# Patient Record
Sex: MALE | Race: WHITE | NOT HISPANIC OR LATINO | Employment: FULL TIME | RURAL
[De-identification: names, ages, dates, MRNs, and addresses within clinical notes are randomized per-mention and may not be internally consistent; named-entity substitution may affect disease eponyms.]

---

## 2022-08-18 ENCOUNTER — OFFICE VISIT (OUTPATIENT)
Dept: FAMILY MEDICINE | Facility: CLINIC | Age: 62
End: 2022-08-18
Payer: OTHER GOVERNMENT

## 2022-08-18 VITALS
OXYGEN SATURATION: 96 % | SYSTOLIC BLOOD PRESSURE: 126 MMHG | WEIGHT: 215.88 LBS | DIASTOLIC BLOOD PRESSURE: 84 MMHG | TEMPERATURE: 99 F | RESPIRATION RATE: 16 BRPM | HEART RATE: 72 BPM | BODY MASS INDEX: 28.61 KG/M2 | HEIGHT: 73 IN

## 2022-08-18 DIAGNOSIS — I10 ESSENTIAL HYPERTENSION: Primary | Chronic | ICD-10-CM

## 2022-08-18 DIAGNOSIS — M76.61 ACHILLES TENDINITIS OF RIGHT LOWER EXTREMITY: ICD-10-CM

## 2022-08-18 DIAGNOSIS — Z79.899 ENCOUNTER FOR LONG-TERM (CURRENT) USE OF OTHER MEDICATIONS: ICD-10-CM

## 2022-08-18 DIAGNOSIS — Z12.5 ENCOUNTER FOR SCREENING FOR MALIGNANT NEOPLASM OF PROSTATE: ICD-10-CM

## 2022-08-18 LAB
ALBUMIN SERPL BCP-MCNC: 4.2 G/DL (ref 3.5–5)
ALBUMIN/GLOB SERPL: 1.2 {RATIO}
ALP SERPL-CCNC: 61 U/L (ref 45–115)
ALT SERPL W P-5'-P-CCNC: 24 U/L (ref 16–61)
ANION GAP SERPL CALCULATED.3IONS-SCNC: 12 MMOL/L (ref 7–16)
AST SERPL W P-5'-P-CCNC: 18 U/L (ref 15–37)
BASOPHILS # BLD AUTO: 0.04 K/UL (ref 0–0.2)
BASOPHILS NFR BLD AUTO: 0.6 % (ref 0–1)
BILIRUB SERPL-MCNC: 0.8 MG/DL (ref 0–1.2)
BILIRUB UR QL STRIP: NEGATIVE
BUN SERPL-MCNC: 13 MG/DL (ref 7–18)
BUN/CREAT SERPL: 15 (ref 6–20)
CALCIUM SERPL-MCNC: 8.9 MG/DL (ref 8.5–10.1)
CHLORIDE SERPL-SCNC: 101 MMOL/L (ref 98–107)
CHOLEST SERPL-MCNC: 191 MG/DL (ref 0–200)
CHOLEST/HDLC SERPL: 3.2 {RATIO}
CLARITY UR: CLEAR
CO2 SERPL-SCNC: 27 MMOL/L (ref 21–32)
COLOR UR: NORMAL
CREAT SERPL-MCNC: 0.87 MG/DL (ref 0.7–1.3)
DIFFERENTIAL METHOD BLD: ABNORMAL
EGFR (NO RACE VARIABLE) (RUSH/TITUS): 98 ML/MIN/1.73M²
EOSINOPHIL # BLD AUTO: 0.34 K/UL (ref 0–0.5)
EOSINOPHIL NFR BLD AUTO: 4.9 % (ref 1–4)
ERYTHROCYTE [DISTWIDTH] IN BLOOD BY AUTOMATED COUNT: 12.9 % (ref 11.5–14.5)
GLOBULIN SER-MCNC: 3.6 G/DL (ref 2–4)
GLUCOSE SERPL-MCNC: 90 MG/DL (ref 74–106)
GLUCOSE UR STRIP-MCNC: NORMAL MG/DL
HCT VFR BLD AUTO: 45.6 % (ref 40–54)
HDLC SERPL-MCNC: 60 MG/DL (ref 40–60)
HGB BLD-MCNC: 15.7 G/DL (ref 13.5–18)
IMM GRANULOCYTES # BLD AUTO: 0.01 K/UL (ref 0–0.04)
IMM GRANULOCYTES NFR BLD: 0.1 % (ref 0–0.4)
KETONES UR STRIP-SCNC: NEGATIVE MG/DL
LDLC SERPL CALC-MCNC: 101 MG/DL
LDLC/HDLC SERPL: 1.7 {RATIO}
LEUKOCYTE ESTERASE UR QL STRIP: NEGATIVE
LYMPHOCYTES # BLD AUTO: 2.29 K/UL (ref 1–4.8)
LYMPHOCYTES NFR BLD AUTO: 32.7 % (ref 27–41)
MCH RBC QN AUTO: 30 PG (ref 27–31)
MCHC RBC AUTO-ENTMCNC: 34.4 G/DL (ref 32–36)
MCV RBC AUTO: 87.2 FL (ref 80–96)
MONOCYTES # BLD AUTO: 0.61 K/UL (ref 0–0.8)
MONOCYTES NFR BLD AUTO: 8.7 % (ref 2–6)
MPC BLD CALC-MCNC: 9.9 FL (ref 9.4–12.4)
NEUTROPHILS # BLD AUTO: 3.71 K/UL (ref 1.8–7.7)
NEUTROPHILS NFR BLD AUTO: 53 % (ref 53–65)
NITRITE UR QL STRIP: NEGATIVE
NONHDLC SERPL-MCNC: 131 MG/DL
NRBC # BLD AUTO: 0 X10E3/UL
NRBC, AUTO (.00): 0 %
PH UR STRIP: 6.5 PH UNITS
PLATELET # BLD AUTO: 287 K/UL (ref 150–400)
POTASSIUM SERPL-SCNC: 4 MMOL/L (ref 3.5–5.1)
PROT SERPL-MCNC: 7.8 G/DL (ref 6.4–8.2)
PROT UR QL STRIP: NEGATIVE
PSA SERPL-MCNC: 0.69 NG/ML (ref 0–4.1)
RBC # BLD AUTO: 5.23 M/UL (ref 4.6–6.2)
RBC # UR STRIP: NEGATIVE /UL
SODIUM SERPL-SCNC: 136 MMOL/L (ref 136–145)
SP GR UR STRIP: 1.01
TRIGL SERPL-MCNC: 149 MG/DL (ref 35–150)
TSH SERPL DL<=0.005 MIU/L-ACNC: 2.1 UIU/ML (ref 0.36–3.74)
UROBILINOGEN UR STRIP-ACNC: NORMAL MG/DL
VLDLC SERPL-MCNC: 30 MG/DL
WBC # BLD AUTO: 7 K/UL (ref 4.5–11)

## 2022-08-18 PROCEDURE — 85025 COMPLETE CBC W/AUTO DIFF WBC: CPT | Mod: ,,, | Performed by: CLINICAL MEDICAL LABORATORY

## 2022-08-18 PROCEDURE — 80053 COMPREHENSIVE METABOLIC PANEL: ICD-10-PCS | Mod: ,,, | Performed by: CLINICAL MEDICAL LABORATORY

## 2022-08-18 PROCEDURE — 80061 LIPID PANEL: ICD-10-PCS | Mod: ,,, | Performed by: CLINICAL MEDICAL LABORATORY

## 2022-08-18 PROCEDURE — 81003 URINALYSIS: ICD-10-PCS | Mod: QW,,, | Performed by: CLINICAL MEDICAL LABORATORY

## 2022-08-18 PROCEDURE — 85025 CBC WITH DIFFERENTIAL: ICD-10-PCS | Mod: ,,, | Performed by: CLINICAL MEDICAL LABORATORY

## 2022-08-18 PROCEDURE — G0103 PSA, SCREENING: ICD-10-PCS | Mod: ,,, | Performed by: CLINICAL MEDICAL LABORATORY

## 2022-08-18 PROCEDURE — 99204 OFFICE O/P NEW MOD 45 MIN: CPT | Mod: ,,, | Performed by: NURSE PRACTITIONER

## 2022-08-18 PROCEDURE — 84443 TSH: ICD-10-PCS | Mod: ,,, | Performed by: CLINICAL MEDICAL LABORATORY

## 2022-08-18 PROCEDURE — 99204 PR OFFICE/OUTPT VISIT, NEW, LEVL IV, 45-59 MIN: ICD-10-PCS | Mod: ,,, | Performed by: NURSE PRACTITIONER

## 2022-08-18 PROCEDURE — 80053 COMPREHEN METABOLIC PANEL: CPT | Mod: ,,, | Performed by: CLINICAL MEDICAL LABORATORY

## 2022-08-18 PROCEDURE — 81003 URINALYSIS AUTO W/O SCOPE: CPT | Mod: QW,,, | Performed by: CLINICAL MEDICAL LABORATORY

## 2022-08-18 PROCEDURE — 80061 LIPID PANEL: CPT | Mod: ,,, | Performed by: CLINICAL MEDICAL LABORATORY

## 2022-08-18 PROCEDURE — G0103 PSA SCREENING: HCPCS | Mod: ,,, | Performed by: CLINICAL MEDICAL LABORATORY

## 2022-08-18 PROCEDURE — 84443 ASSAY THYROID STIM HORMONE: CPT | Mod: ,,, | Performed by: CLINICAL MEDICAL LABORATORY

## 2022-08-18 RX ORDER — LISINOPRIL AND HYDROCHLOROTHIAZIDE 10; 12.5 MG/1; MG/1
0.5 TABLET ORAL EVERY OTHER DAY
Qty: 45 TABLET | Refills: 3 | Status: SHIPPED | OUTPATIENT
Start: 2022-08-18 | End: 2023-10-02

## 2022-08-18 RX ORDER — FLUTICASONE PROPIONATE 50 MCG
1 SPRAY, SUSPENSION (ML) NASAL DAILY
COMMUNITY
Start: 2022-04-19

## 2022-08-18 RX ORDER — LISINOPRIL AND HYDROCHLOROTHIAZIDE 10; 12.5 MG/1; MG/1
0.5 TABLET ORAL EVERY OTHER DAY
COMMUNITY
Start: 2022-06-01 | End: 2022-08-18 | Stop reason: SDUPTHER

## 2022-08-18 NOTE — PROGRESS NOTES
Subjective:       Patient ID: Julio Hummel is a 62 y.o. male.    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 Hx of multiple TIAs (last was in 2039-0045); controlling diet and BP now and has done well. Also, sprain to right achilles tendon several yrs ago and has flare ups occasionally. Flares mostly occur with working; has to wear steel toed boots.     Ankle Pain   Incident location: while running. The injury mechanism was a fall. The pain is present in the right ankle. The quality of the pain is described as aching, burning and shooting. The pain is moderate. The pain has been intermittent since onset. Associated symptoms include an inability to bear weight and a loss of motion. Pertinent negatives include no loss of sensation, muscle weakness, numbness or tingling. He reports no foreign bodies present. The symptoms are aggravated by weight bearing. He has tried rest, non-weight bearing, heat and ice for the symptoms. The treatment provided significant relief.   Hypertension  This is a chronic problem. The current episode started more than 1 year ago. The problem is controlled. Pertinent negatives include no anxiety, blurred vision, chest pain, headaches, malaise/fatigue, neck pain, orthopnea, palpitations, peripheral edema, PND, shortness of breath or sweats. There are no associated agents to hypertension. Risk factors for coronary artery disease include male gender and stress. Past treatments include ACE inhibitors and diuretics. The current treatment provides significant improvement. There are no compliance problems.  There is no history of angina, kidney disease, CAD/MI, CVA, heart failure, left ventricular hypertrophy, PVD or retinopathy. There is no history of chronic renal disease, coarctation of the aorta, hyperaldosteronism, hypercortisolism, hyperparathyroidism, a hypertension causing med, pheochromocytoma, renovascular disease, sleep  apnea or a thyroid problem.     Review of Systems   Constitutional: Negative for activity change, appetite change, chills, fatigue, fever, malaise/fatigue and unexpected weight change.   HENT: Negative for nasal congestion, ear pain, facial swelling, mouth sores, nosebleeds, postnasal drip, rhinorrhea, sinus pressure/congestion and sore throat.    Eyes: Negative for blurred vision, photophobia, pain, discharge and visual disturbance.   Respiratory: Negative for cough, chest tightness and shortness of breath.    Cardiovascular: Negative for chest pain, palpitations, orthopnea, leg swelling and PND.   Gastrointestinal: Negative for abdominal distention and abdominal pain.   Genitourinary: Negative for difficulty urinating, dysuria and testicular pain.   Musculoskeletal: Negative for arthralgias, back pain, gait problem and neck pain.   Integumentary:  Negative for pallor, rash and mole/lesion.   Neurological: Negative for dizziness, tingling, syncope, weakness, light-headedness, numbness and headaches.   Hematological: Negative for adenopathy. Does not bruise/bleed easily.   Psychiatric/Behavioral: Negative for sleep disturbance.         Objective:      Physical Exam  Vitals reviewed.   Constitutional:       Appearance: He is normal weight.   HENT:      Head: Normocephalic.      Right Ear: Tympanic membrane, ear canal and external ear normal.      Left Ear: Tympanic membrane, ear canal and external ear normal.      Nose: Nose normal.      Mouth/Throat:      Mouth: Mucous membranes are moist.   Eyes:      Pupils: Pupils are equal, round, and reactive to light.   Neck:      Vascular: No carotid bruit.   Cardiovascular:      Rate and Rhythm: Regular rhythm.      Pulses: Normal pulses.      Heart sounds: Normal heart sounds.   Pulmonary:      Effort: Pulmonary effort is normal.      Breath sounds: Normal breath sounds.   Abdominal:      General: Bowel sounds are normal.      Palpations: Abdomen is soft.   Musculoskeletal:          General: Normal range of motion.      Cervical back: Normal range of motion and neck supple.   Skin:     General: Skin is warm and dry.      Capillary Refill: Capillary refill takes less than 2 seconds.   Neurological:      Mental Status: He is alert and oriented to person, place, and time.      Gait: Gait normal.   Psychiatric:         Mood and Affect: Mood normal.         Behavior: Behavior normal.         Assessment:       Problem List Items Addressed This Visit        Cardiac/Vascular    Essential hypertension - Primary    Relevant Medications    lisinopriL-hydrochlorothiazide (PRINZIDE,ZESTORETIC) 10-12.5 mg per tablet    Other Relevant Orders    CBC Auto Differential    Comprehensive Metabolic Panel    Lipid Panel    Urinalysis       Renal/    Encounter for screening for malignant neoplasm of prostate    Relevant Orders    PSA, Screening       Orthopedic    Achilles tendinitis of right lower extremity     Needs orthotics in work boots; also recommend 6min ice massage when inflamed, and also recommend daily collagen supplement specifically for joints              Other    Encounter for long-term (current) use of other medications    Relevant Orders    TSH          Plan:       Labs pending. CPT. RTC 6-12mo or prn

## 2022-08-18 NOTE — ASSESSMENT & PLAN NOTE
Needs orthotics in work boots; also recommend 6min ice massage when inflamed, and also recommend daily collagen supplement specifically for joints

## 2023-09-28 ENCOUNTER — HOSPITAL ENCOUNTER (EMERGENCY)
Facility: HOSPITAL | Age: 63
Discharge: HOME | End: 2023-09-28
Payer: OTHER GOVERNMENT

## 2023-09-28 ENCOUNTER — OFFICE VISIT (OUTPATIENT)
Dept: FAMILY MEDICINE | Facility: CLINIC | Age: 63
End: 2023-09-28
Payer: OTHER GOVERNMENT

## 2023-09-28 VITALS
OXYGEN SATURATION: 95 % | WEIGHT: 228.81 LBS | HEIGHT: 73 IN | BODY MASS INDEX: 30.33 KG/M2 | SYSTOLIC BLOOD PRESSURE: 145 MMHG | HEART RATE: 63 BPM | DIASTOLIC BLOOD PRESSURE: 90 MMHG | TEMPERATURE: 98 F | RESPIRATION RATE: 18 BRPM

## 2023-09-28 VITALS
HEIGHT: 73 IN | DIASTOLIC BLOOD PRESSURE: 83 MMHG | WEIGHT: 228.81 LBS | OXYGEN SATURATION: 94 % | SYSTOLIC BLOOD PRESSURE: 159 MMHG | BODY MASS INDEX: 30.33 KG/M2 | RESPIRATION RATE: 18 BRPM | HEART RATE: 76 BPM

## 2023-09-28 DIAGNOSIS — R42 DIZZINESS: Primary | ICD-10-CM

## 2023-09-28 DIAGNOSIS — G45.9 TIA (TRANSIENT ISCHEMIC ATTACK): Primary | ICD-10-CM

## 2023-09-28 DIAGNOSIS — I10 ESSENTIAL HYPERTENSION: ICD-10-CM

## 2023-09-28 DIAGNOSIS — I10 HYPERTENSION: ICD-10-CM

## 2023-09-28 DIAGNOSIS — R73.9 HYPERGLYCEMIA: ICD-10-CM

## 2023-09-28 LAB
ALBUMIN SERPL BCP-MCNC: 4 G/DL (ref 3.5–5)
ALBUMIN/GLOB SERPL: 1.1 {RATIO}
ALP SERPL-CCNC: 79 U/L (ref 45–115)
ALT SERPL W P-5'-P-CCNC: 43 U/L (ref 16–61)
ANION GAP SERPL CALCULATED.3IONS-SCNC: 8 MMOL/L (ref 7–16)
ANION GAP SERPL CALCULATED.3IONS-SCNC: 9 MMOL/L (ref 7–16)
APTT PPP: 25.1 SECONDS (ref 25.2–37.3)
AST SERPL W P-5'-P-CCNC: 27 U/L (ref 15–37)
BASOPHILS # BLD AUTO: 0.05 K/UL (ref 0–0.2)
BASOPHILS # BLD AUTO: 0.06 K/UL (ref 0–0.2)
BASOPHILS NFR BLD AUTO: 0.7 % (ref 0–1)
BASOPHILS NFR BLD AUTO: 0.8 % (ref 0–1)
BILIRUB SERPL-MCNC: 0.4 MG/DL (ref ?–1.2)
BUN SERPL-MCNC: 19 MG/DL (ref 7–18)
BUN SERPL-MCNC: 21 MG/DL (ref 7–18)
BUN/CREAT SERPL: 20 (ref 6–20)
BUN/CREAT SERPL: 24 (ref 6–20)
CALCIUM SERPL-MCNC: 8.8 MG/DL (ref 8.5–10.1)
CALCIUM SERPL-MCNC: 9.1 MG/DL (ref 8.5–10.1)
CHLORIDE SERPL-SCNC: 104 MMOL/L (ref 98–107)
CHLORIDE SERPL-SCNC: 106 MMOL/L (ref 98–107)
CO2 SERPL-SCNC: 27 MMOL/L (ref 21–32)
CO2 SERPL-SCNC: 30 MMOL/L (ref 21–32)
CREAT SERPL-MCNC: 0.89 MG/DL (ref 0.7–1.3)
CREAT SERPL-MCNC: 0.94 MG/DL (ref 0.7–1.3)
DIFFERENTIAL METHOD BLD: ABNORMAL
DIFFERENTIAL METHOD BLD: ABNORMAL
EGFR (NO RACE VARIABLE) (RUSH/TITUS): 92 ML/MIN/1.73M2
EGFR (NO RACE VARIABLE) (RUSH/TITUS): 97 ML/MIN/1.73M2
EOSINOPHIL # BLD AUTO: 0.2 K/UL (ref 0–0.5)
EOSINOPHIL # BLD AUTO: 0.22 K/UL (ref 0–0.5)
EOSINOPHIL NFR BLD AUTO: 2.7 % (ref 1–4)
EOSINOPHIL NFR BLD AUTO: 3 % (ref 1–4)
ERYTHROCYTE [DISTWIDTH] IN BLOOD BY AUTOMATED COUNT: 13.5 % (ref 11.5–14.5)
ERYTHROCYTE [DISTWIDTH] IN BLOOD BY AUTOMATED COUNT: 13.5 % (ref 11.5–14.5)
GLOBULIN SER-MCNC: 3.8 G/DL (ref 2–4)
GLUCOSE SERPL-MCNC: 105 MG/DL (ref 74–106)
GLUCOSE SERPL-MCNC: 110 MG/DL (ref 74–106)
HCT VFR BLD AUTO: 45.6 % (ref 40–54)
HCT VFR BLD AUTO: 45.8 % (ref 40–54)
HGB BLD-MCNC: 15.2 G/DL (ref 13.5–18)
HGB BLD-MCNC: 15.3 G/DL (ref 13.5–18)
IMM GRANULOCYTES # BLD AUTO: 0.02 K/UL (ref 0–0.04)
IMM GRANULOCYTES # BLD AUTO: 0.03 K/UL (ref 0–0.04)
IMM GRANULOCYTES NFR BLD: 0.3 % (ref 0–0.4)
IMM GRANULOCYTES NFR BLD: 0.4 % (ref 0–0.4)
INR BLD: 0.98
LYMPHOCYTES # BLD AUTO: 1.77 K/UL (ref 1–4.8)
LYMPHOCYTES # BLD AUTO: 1.84 K/UL (ref 1–4.8)
LYMPHOCYTES NFR BLD AUTO: 24.2 % (ref 27–41)
LYMPHOCYTES NFR BLD AUTO: 25 % (ref 27–41)
MAGNESIUM SERPL-MCNC: 1.9 MG/DL (ref 1.7–2.3)
MCH RBC QN AUTO: 29.6 PG (ref 27–31)
MCH RBC QN AUTO: 29.7 PG (ref 27–31)
MCHC RBC AUTO-ENTMCNC: 33.3 G/DL (ref 32–36)
MCHC RBC AUTO-ENTMCNC: 33.4 G/DL (ref 32–36)
MCV RBC AUTO: 88.8 FL (ref 80–96)
MCV RBC AUTO: 88.9 FL (ref 80–96)
MONOCYTES # BLD AUTO: 0.45 K/UL (ref 0–0.8)
MONOCYTES # BLD AUTO: 0.47 K/UL (ref 0–0.8)
MONOCYTES NFR BLD AUTO: 6.1 % (ref 2–6)
MONOCYTES NFR BLD AUTO: 6.4 % (ref 2–6)
MPC BLD CALC-MCNC: 10 FL (ref 9.4–12.4)
MPC BLD CALC-MCNC: 9.6 FL (ref 9.4–12.4)
NEUTROPHILS # BLD AUTO: 4.78 K/UL (ref 1.8–7.7)
NEUTROPHILS # BLD AUTO: 4.78 K/UL (ref 1.8–7.7)
NEUTROPHILS NFR BLD AUTO: 64.8 % (ref 53–65)
NEUTROPHILS NFR BLD AUTO: 65.6 % (ref 53–65)
NRBC # BLD AUTO: 0 X10E3/UL
NRBC # BLD AUTO: 0 X10E3/UL
NRBC, AUTO (.00): 0 %
NRBC, AUTO (.00): 0 %
PLATELET # BLD AUTO: 264 K/UL (ref 150–400)
PLATELET # BLD AUTO: 284 K/UL (ref 150–400)
POTASSIUM SERPL-SCNC: 4 MMOL/L (ref 3.5–5.1)
POTASSIUM SERPL-SCNC: 4.3 MMOL/L (ref 3.5–5.1)
PROT SERPL-MCNC: 7.8 G/DL (ref 6.4–8.2)
PROTHROMBIN TIME: 12.9 SECONDS (ref 11.7–14.7)
PSA SERPL-MCNC: 0.72 NG/ML
RBC # BLD AUTO: 5.13 M/UL (ref 4.6–6.2)
RBC # BLD AUTO: 5.16 M/UL (ref 4.6–6.2)
SODIUM SERPL-SCNC: 137 MMOL/L (ref 136–145)
SODIUM SERPL-SCNC: 139 MMOL/L (ref 136–145)
TROPONIN I SERPL DL<=0.01 NG/ML-MCNC: 5.2 PG/ML
WBC # BLD AUTO: 7.3 K/UL (ref 4.5–11)
WBC # BLD AUTO: 7.37 K/UL (ref 4.5–11)

## 2023-09-28 PROCEDURE — G0103 PSA SCREENING: HCPCS | Mod: ,,, | Performed by: CLINICAL MEDICAL LABORATORY

## 2023-09-28 PROCEDURE — G0103 PSA, SCREENING: ICD-10-PCS | Mod: ,,, | Performed by: CLINICAL MEDICAL LABORATORY

## 2023-09-28 PROCEDURE — 85730 THROMBOPLASTIN TIME PARTIAL: CPT | Performed by: NURSE PRACTITIONER

## 2023-09-28 PROCEDURE — 99213 OFFICE O/P EST LOW 20 MIN: CPT | Mod: ,,, | Performed by: NURSE PRACTITIONER

## 2023-09-28 PROCEDURE — 84484 ASSAY OF TROPONIN QUANT: CPT | Performed by: NURSE PRACTITIONER

## 2023-09-28 PROCEDURE — 83735 ASSAY OF MAGNESIUM: CPT | Performed by: NURSE PRACTITIONER

## 2023-09-28 PROCEDURE — 80053 COMPREHENSIVE METABOLIC PANEL: ICD-10-PCS | Mod: ,,, | Performed by: CLINICAL MEDICAL LABORATORY

## 2023-09-28 PROCEDURE — 85025 CBC WITH DIFFERENTIAL: ICD-10-PCS | Mod: ,,, | Performed by: CLINICAL MEDICAL LABORATORY

## 2023-09-28 PROCEDURE — 80053 COMPREHEN METABOLIC PANEL: CPT | Mod: ,,, | Performed by: CLINICAL MEDICAL LABORATORY

## 2023-09-28 PROCEDURE — 83036 HEMOGLOBIN A1C: ICD-10-PCS | Mod: ,,, | Performed by: CLINICAL MEDICAL LABORATORY

## 2023-09-28 PROCEDURE — 83036 HEMOGLOBIN GLYCOSYLATED A1C: CPT | Mod: ,,, | Performed by: CLINICAL MEDICAL LABORATORY

## 2023-09-28 PROCEDURE — 85025 COMPLETE CBC W/AUTO DIFF WBC: CPT | Mod: ,,, | Performed by: CLINICAL MEDICAL LABORATORY

## 2023-09-28 PROCEDURE — 99213 PR OFFICE/OUTPT VISIT, EST, LEVL III, 20-29 MIN: ICD-10-PCS | Mod: ,,, | Performed by: NURSE PRACTITIONER

## 2023-09-28 PROCEDURE — 93010 EKG 12-LEAD: ICD-10-PCS | Mod: ,,, | Performed by: STUDENT IN AN ORGANIZED HEALTH CARE EDUCATION/TRAINING PROGRAM

## 2023-09-28 PROCEDURE — 85025 COMPLETE CBC W/AUTO DIFF WBC: CPT | Performed by: NURSE PRACTITIONER

## 2023-09-28 PROCEDURE — 99285 EMERGENCY DEPT VISIT HI MDM: CPT | Mod: 25

## 2023-09-28 PROCEDURE — 80048 BASIC METABOLIC PNL TOTAL CA: CPT | Mod: XB | Performed by: NURSE PRACTITIONER

## 2023-09-28 PROCEDURE — 85610 PROTHROMBIN TIME: CPT | Performed by: NURSE PRACTITIONER

## 2023-09-28 PROCEDURE — 93010 ELECTROCARDIOGRAM REPORT: CPT | Mod: ,,, | Performed by: STUDENT IN AN ORGANIZED HEALTH CARE EDUCATION/TRAINING PROGRAM

## 2023-09-28 PROCEDURE — 93005 ELECTROCARDIOGRAM TRACING: CPT

## 2023-09-28 PROCEDURE — 25000003 PHARM REV CODE 250: Performed by: NURSE PRACTITIONER

## 2023-09-28 PROCEDURE — 99285 PR EMERGENCY DEPT VISIT,LEVEL V: ICD-10-PCS | Mod: ,,, | Performed by: NURSE PRACTITIONER

## 2023-09-28 PROCEDURE — 99285 EMERGENCY DEPT VISIT HI MDM: CPT | Mod: ,,, | Performed by: NURSE PRACTITIONER

## 2023-09-28 RX ORDER — NAPROXEN SODIUM 220 MG/1
81 TABLET, FILM COATED ORAL DAILY
Status: DISCONTINUED | OUTPATIENT
Start: 2023-09-29 | End: 2023-09-28

## 2023-09-28 RX ORDER — NAPROXEN SODIUM 220 MG/1
81 TABLET, FILM COATED ORAL
Status: COMPLETED | OUTPATIENT
Start: 2023-09-28 | End: 2023-09-28

## 2023-09-28 RX ADMIN — ASPIRIN 81 MG CHEWABLE TABLET 81 MG: 81 TABLET CHEWABLE at 04:09

## 2023-09-28 NOTE — ED PROVIDER NOTES
Encounter Date: 9/28/2023       History     Chief Complaint   Patient presents with    Hypertension     63 year old male presents to the emergency department to be evaluated for feeling lightheaded and having headaches intermittently over the last 3 weeks. He had his blood pressure checked, this morning, and it was 175/94, then it was 152/93, then it was 142/58.  He went to the clinic to be evaluated and began having pressure in his left arm and was sent to the emergency department to have an EKG.  He said he feels a little bit of a tingling sensation to the left side of his face that is improving.  Denies any unilateral weakness, difficulty speaking, swallowing, walking.  He had a TIA in 2012. He does not currently have a neurologist.     The history is provided by the patient.     Review of patient's allergies indicates:   Allergen Reactions    Macrolide antibiotics     Cephalosporins Rash    Doxy Rash    Pcn [penicillins] Rash    Sulfa (sulfonamide antibiotics) Rash     No past medical history on file.  No past surgical history on file.  No family history on file.  Social History     Tobacco Use    Smoking status: Never    Smokeless tobacco: Never     Review of Systems   All other systems reviewed and are negative.      Physical Exam     Initial Vitals [09/28/23 1454]   BP Pulse Resp Temp SpO2   (!) 189/106 72 18 98 °F (36.7 °C) 96 %      MAP       --         Physical Exam    Vitals reviewed.  Constitutional: He appears well-developed and well-nourished.   HENT:   Head: Normocephalic and atraumatic.   Eyes: EOM are normal. Pupils are equal, round, and reactive to light.   Neck: Neck supple.   Cardiovascular:  Normal rate and regular rhythm.           Pulmonary/Chest: Breath sounds normal.   Abdominal: Abdomen is soft. Bowel sounds are normal. He exhibits no distension and no mass. There is no abdominal tenderness. There is no rebound and no guarding.   Musculoskeletal:         General: Normal range of motion.       Cervical back: Neck supple.     Neurological: He is alert and oriented to person, place, and time. He has normal strength. No cranial nerve deficit or sensory deficit. GCS score is 15. GCS eye subscore is 4. GCS verbal subscore is 5. GCS motor subscore is 6.   Skin: Skin is warm and dry. Capillary refill takes less than 2 seconds.   Psychiatric: He has a normal mood and affect.         Medical Screening Exam   See Full Note    ED Course   Procedures  Labs Reviewed   BASIC METABOLIC PANEL - Abnormal; Notable for the following components:       Result Value    Glucose 110 (*)     BUN 21 (*)     BUN/Creatinine Ratio 24 (*)     All other components within normal limits   APTT - Abnormal; Notable for the following components:    PTT 25.1 (*)     All other components within normal limits   CBC WITH DIFFERENTIAL - Abnormal; Notable for the following components:    Neutrophils % 65.6 (*)     Lymphocytes % 24.2 (*)     Monocytes % 6.4 (*)     All other components within normal limits   TROPONIN I - Normal   PROTIME-INR - Normal   MAGNESIUM - Normal   CBC W/ AUTO DIFFERENTIAL    Narrative:     The following orders were created for panel order CBC Auto Differential.  Procedure                               Abnormality         Status                     ---------                               -----------         ------                     CBC with Differential[[id number]]       Abnormal            Final result                 Please view results for these tests on the individual orders.          Imaging Results              CT Head Without Contrast (Final result)  Result time 09/28/23 15:05:22      Final result by Dereje Bautista DO (09/28/23 15:05:22)                   Impression:      No convincing imaging evidence of acute intracranial abnormality.  Probable chronic microvascular ischemic change and volume loss.    The CT exam was performed using one or more of the following dose    reduction techniques- Automated exposure  control, adjustment of the mA    and/or kV according to patient size, and/or use of iterative    reconstructed technique.    Point of Service: [facility]      Electronically signed by: Dereje Bautista  Date:    09/28/2023  Time:    15:05               Narrative:    EXAMINATION:  CT HEAD WITHOUT CONTRAST    CLINICAL HISTORY:  Headache, chronic, new features or increased frequency;    COMPARISON:  None    TECHNIQUE:  Multiple axial tomographic images of the brain were obtained without the use of intravenous contrast.    FINDINGS:  Midline structures are nondisplaced.  No convincing evidence of acute intracranial hemorrhage.  No convincing evidence of hydrocephalus.  Mild global volume loss demonstrated.  Mild periventricular and subcortical hypoattenuation noted which is nonspecific but consistent with chronic microvascular ischemic change. Less likely considerations include demyelinating process and vasculitis. Visualized paranasal sinuses and mastoid air cells are predominantly clear.                                       X-Ray Chest 1 View (Final result)  Result time 09/28/23 15:06:33      Final result by Dereje Bautista DO (09/28/23 15:06:33)                   Impression:      No acute cardiopulmonary process demonstrated.    Point of Service: [facility]      Electronically signed by: Dereje Bautista  Date:    09/28/2023  Time:    15:06               Narrative:    EXAMINATION:  XR CHEST 1 VIEW    CLINICAL HISTORY:  Essential (primary) hypertension    COMPARISON:  None    TECHNIQUE:  Frontal view/views of the chest.    FINDINGS:  Heart size appears within normal limits.  Chronic change of the lungs without focal consolidation, pleural effusion, or pneumothorax.  Visualized osseous and surrounding soft tissue structures demonstrate no acute abnormality.                                       Medications   aspirin chewable tablet 81 mg (81 mg Oral Given 9/28/23 1616)     Medical Decision  Making  63 year old male presents to the emergency department to be evaluated for feeling lightheaded and having headaches intermittently over the last 3 weeks. He had his blood pressure checked, this morning, and it was 175/94, then it was 152/93, then it was 142/58.  He went to the clinic to be evaluated and began having pressure in his left arm and was sent to the emergency department to have an EKG.  He said he feels a little bit of a tingling sensation to the left side of his face that is improving.  Denies any unilateral weakness, difficulty speaking, swallowing, walking.  He had a TIA in 2012. He does not currently have a neurologist.   MDM  I ordered labs and personally reviewed them.    I ordered X-rays and personally reviewed them and reviewed the radiologist interpretation.   I ordered EKG and personally reviewed it.  EKG significant for normal sinus rhythm.    I ordered CT scan and personally reviewed it and reviewed the radiologist interpretation.  CT significant for No convincing imaging evidence of acute intracranial abnormality.  Probable chronic microvascular ischemic change and volume loss..    His blood pressure medication was doubled by his PCP earlier today  Diagnosis:  TIA  Discharge [facility]  I discussed the patient presentation and findings with the consultant for Neurology, Karan Toth NP    Will start on a baby aspirin daily and referred to follow-up outpatient for further workup with Neurology  Patient was managed in the ED with aspirin    The response to treatment was improved.    Patient was discharged in stable condition.  Detailed return precautions discussed.      Amount and/or Complexity of Data Reviewed  Labs: ordered.  Radiology: ordered.    Risk  OTC drugs.                               Clinical Impression:   Final diagnoses:  [I10] Hypertension  [G45.9] TIA (transient ischemic attack) (Primary)        ED Disposition Condition    Discharge Stable          ED Prescriptions    None        Follow-up Information    None          Berenice Samaniego, [facility]  09/29/23 0806

## 2023-09-28 NOTE — DISCHARGE INSTRUCTIONS
Take 81 mg aspirin daily.  Follow-up with Neurology, you should be contacted with an appointment.Follow up with your primary care provider in 2 days. Return to the emergency department for any increase in symptoms or for any other new or worrisome symptoms.

## 2023-09-29 ENCOUNTER — TELEPHONE (OUTPATIENT)
Dept: EMERGENCY MEDICINE | Facility: HOSPITAL | Age: 63
End: 2023-09-29
Payer: OTHER GOVERNMENT

## 2023-09-29 PROBLEM — R42 DIZZINESS: Status: ACTIVE | Noted: 2023-09-29

## 2023-09-29 NOTE — PROGRESS NOTES
"Subjective:       Patient ID: Julio Hummel is a 63 y.o. male.    Chief Complaint: Hypertension (Patient is in due to blood pressure issues )    Patient presetns to clinic with complaints of fluctuating blood pressure levels. States over the last 3-4 weeks he has had "episodes" of feeling a "halo effect" or lightheaded feeling with sweating. States he checks his blood pressure at the time being 150-160s/ 90s. Episodes lasting 15-30 mins. States episodes are now lasting closer to an hour. Denies CP or SOB. NO weakness or numbness.    Reports PMH TIA 2012- does not take ASA.    Active Problem List with Overview Notes    Diagnosis Date Noted    Dizziness 09/29/2023    Essential hypertension 08/18/2022    Encounter for screening for malignant neoplasm of prostate 08/18/2022    Encounter for long-term (current) use of other medications 08/18/2022    Achilles tendinitis of right lower extremity 08/18/2022        Review of Systems   Constitutional:  Positive for diaphoresis. Negative for chills, fatigue and fever.   HENT:  Negative for congestion, hearing loss, postnasal drip, rhinorrhea, sore throat, tinnitus and voice change.    Respiratory:  Negative for apnea, cough, choking, chest tightness and shortness of breath.    Cardiovascular:  Negative for chest pain, palpitations and leg swelling.   Gastrointestinal:  Negative for abdominal pain, constipation, diarrhea and nausea.   Genitourinary:  Negative for difficulty urinating.   Neurological:  Positive for light-headedness and headaches. Negative for dizziness, tremors, seizures, syncope, facial asymmetry, speech difficulty, weakness and numbness.   Psychiatric/Behavioral:  Negative for sleep disturbance.         A1C:      CBC:  Recent Labs   Lab 08/18/22  1053 09/28/23  1351 09/28/23  1510   WBC 7.00 7.37 7.30   RBC 5.23 5.16 5.13   Hemoglobin 15.7 15.3 15.2   Hematocrit 45.6 45.8 45.6   Platelet Count 287 284 264   MCV 87.2 88.8 88.9   MCH 30.0 29.7 29.6   MCHC 34.4 " "33.4 33.3     CMP:  Recent Labs   Lab 08/18/22  1053 09/28/23  1351 09/28/23  1510   Glucose 90 105 110 H   Calcium 8.9 9.1 8.8   Albumin 4.2 4.0  --    Total Protein 7.8 7.8  --    Sodium 136 137 139   Potassium 4.0 4.0 4.3   CO2 27 27 30   Chloride 101 106 104   BUN 13 19 H 21 H   Creatinine 0.87 0.94 0.89   Alk Phos 61 79  --    ALT 24 43  --    AST 18 27  --    Bilirubin, Total 0.8 0.4  --          Objective:      Vitals:    09/28/23 1318   BP: (!) 159/83   Pulse: 76   Resp: 18      Physical Exam  Vitals reviewed.   Constitutional:       Appearance: Normal appearance.   HENT:      Head: Normocephalic.      Right Ear: External ear normal.      Left Ear: External ear normal.      Nose: Nose normal.      Mouth/Throat:      Mouth: Mucous membranes are moist.      Pharynx: Oropharynx is clear.   Eyes:      Pupils: Pupils are equal, round, and reactive to light.   Cardiovascular:      Rate and Rhythm: Normal rate and regular rhythm.      Pulses: Normal pulses.      Heart sounds: Normal heart sounds.   Pulmonary:      Effort: Pulmonary effort is normal.      Breath sounds: Normal breath sounds.   Abdominal:      General: Bowel sounds are normal.      Palpations: Abdomen is soft.   Musculoskeletal:         General: Normal range of motion.      Cervical back: Normal range of motion.   Skin:     General: Skin is warm and dry.   Neurological:      Mental Status: He is alert and oriented to person, place, and time.   Psychiatric:         Mood and Affect: Mood normal.         Behavior: Behavior normal.       During exam patient became diaphoretic and tearful. Clenching left arm and neck. Denies pain. Reports pressure and states "it feels blocked". BP standing 190/108. BP sitting 188/98. HR 70. NO facial asymmetry, weakness, loss of consciousness. NO CP SOB. After 5 mins, symptoms resolved. Patient reported feeling "okay".     Assessment:       1. Dizziness    2. Essential hypertension        Plan:     Problem List Items " Addressed This Visit          Cardiac/Vascular    Essential hypertension       Other    Dizziness - Primary    Relevant Orders    Comprehensive Metabolic Panel (Completed)    CBC Auto Differential (Completed)    PSA, Screening (Completed)    EKG 12-lead    Unable to obtain EKG, CT head at clinic  or immediate lab work. Patient directed to go to ED for full cardiac/neuro workup. Wife and patient verbalize understanding and report wife will drive patient to [facility] ED now.    Follow up to clinic within a week.  Health Maintenance:  Health Maintenance Topics with due status: Not Due       Topic Last Completion Date    Colorectal Cancer Screening 06/21/2021    Lipid Panel 08/18/2022           Juliann Chavis   [facility]   9/28/23

## 2023-09-29 NOTE — ED NOTES
09/29/2023 0813 am  s/w neurology appt desk & have scheduled pt a f/u appt with EVITA Garcia for Monday, October 2, 2023 @ 11:15 am. Have notified the [facility].

## 2023-10-02 ENCOUNTER — OFFICE VISIT (OUTPATIENT)
Dept: NEUROLOGY | Facility: CLINIC | Age: 63
End: 2023-10-02
Payer: OTHER GOVERNMENT

## 2023-10-02 ENCOUNTER — TELEPHONE (OUTPATIENT)
Dept: FAMILY MEDICINE | Facility: CLINIC | Age: 63
End: 2023-10-02
Payer: OTHER GOVERNMENT

## 2023-10-02 ENCOUNTER — OFFICE VISIT (OUTPATIENT)
Dept: FAMILY MEDICINE | Facility: CLINIC | Age: 63
End: 2023-10-02
Payer: OTHER GOVERNMENT

## 2023-10-02 VITALS
RESPIRATION RATE: 18 BRPM | HEIGHT: 73 IN | SYSTOLIC BLOOD PRESSURE: 154 MMHG | HEART RATE: 75 BPM | BODY MASS INDEX: 29.12 KG/M2 | WEIGHT: 219.69 LBS | DIASTOLIC BLOOD PRESSURE: 87 MMHG | OXYGEN SATURATION: 96 %

## 2023-10-02 VITALS
WEIGHT: 226 LBS | SYSTOLIC BLOOD PRESSURE: 152 MMHG | RESPIRATION RATE: 16 BRPM | HEART RATE: 73 BPM | BODY MASS INDEX: 29.95 KG/M2 | HEIGHT: 73 IN | OXYGEN SATURATION: 96 % | DIASTOLIC BLOOD PRESSURE: 90 MMHG

## 2023-10-02 DIAGNOSIS — E03.9 HYPOTHYROIDISM, UNSPECIFIED TYPE: ICD-10-CM

## 2023-10-02 DIAGNOSIS — G45.9 TIA (TRANSIENT ISCHEMIC ATTACK): Primary | ICD-10-CM

## 2023-10-02 DIAGNOSIS — R20.0 LEFT FACIAL NUMBNESS: ICD-10-CM

## 2023-10-02 DIAGNOSIS — Z86.73 HX OF TRANSIENT ISCHEMIC ATTACK (TIA): ICD-10-CM

## 2023-10-02 DIAGNOSIS — R55 NEAR SYNCOPE: ICD-10-CM

## 2023-10-02 DIAGNOSIS — E53.8 VITAMIN B12 DEFICIENCY: ICD-10-CM

## 2023-10-02 DIAGNOSIS — I10 ESSENTIAL HYPERTENSION: Primary | ICD-10-CM

## 2023-10-02 DIAGNOSIS — R73.9 HYPERGLYCEMIA: ICD-10-CM

## 2023-10-02 DIAGNOSIS — G45.9 TRANSIENT CEREBRAL ISCHEMIA, UNSPECIFIED TYPE: Primary | ICD-10-CM

## 2023-10-02 DIAGNOSIS — I10 HYPERTENSION, UNSPECIFIED TYPE: ICD-10-CM

## 2023-10-02 DIAGNOSIS — E78.5 HYPERLIPIDEMIA LDL GOAL <70: ICD-10-CM

## 2023-10-02 DIAGNOSIS — Z09 HOSPITAL DISCHARGE FOLLOW-UP: ICD-10-CM

## 2023-10-02 LAB
EST. AVERAGE GLUCOSE BLD GHB EST-MCNC: 97 MG/DL
HBA1C MFR BLD HPLC: 5.5 % (ref 4.5–6.6)

## 2023-10-02 PROCEDURE — 99213 OFFICE O/P EST LOW 20 MIN: CPT | Mod: ,,, | Performed by: NURSE PRACTITIONER

## 2023-10-02 PROCEDURE — 99204 OFFICE O/P NEW MOD 45 MIN: CPT | Mod: S$PBB,,, | Performed by: NURSE PRACTITIONER

## 2023-10-02 PROCEDURE — 99204 PR OFFICE/OUTPT VISIT, NEW, LEVL IV, 45-59 MIN: ICD-10-PCS | Mod: S$PBB,,, | Performed by: NURSE PRACTITIONER

## 2023-10-02 PROCEDURE — 99215 OFFICE O/P EST HI 40 MIN: CPT | Mod: PBBFAC | Performed by: NURSE PRACTITIONER

## 2023-10-02 PROCEDURE — 99213 PR OFFICE/OUTPT VISIT, EST, LEVL III, 20-29 MIN: ICD-10-PCS | Mod: ,,, | Performed by: NURSE PRACTITIONER

## 2023-10-02 RX ORDER — LISINOPRIL AND HYDROCHLOROTHIAZIDE 10; 12.5 MG/1; MG/1
0.5 TABLET ORAL DAILY
Qty: 45 TABLET | Refills: 3 | OUTPATIENT
Start: 2023-10-02 | End: 2023-10-09

## 2023-10-02 RX ORDER — ATORVASTATIN CALCIUM 40 MG/1
40 TABLET, FILM COATED ORAL DAILY
Qty: 30 TABLET | Refills: 3 | Status: SHIPPED | OUTPATIENT
Start: 2023-10-02 | End: 2023-10-30

## 2023-10-02 RX ORDER — ASPIRIN 81 MG/1
81 TABLET ORAL DAILY
COMMUNITY

## 2023-10-02 NOTE — TELEPHONE ENCOUNTER
Patient called and said that darryn called and told patient he need a referral put in for neurologist which he has already seen the neurologist so he would need the referral put in for last Thursday or before he went to neurologist which he seen the neurologist today he said the er put one in but darryn said it would have to come from his primary dr

## 2023-10-02 NOTE — PATIENT INSTRUCTIONS
MRI brain  MRA brain  Carotid doppler  Echocardiogram  Continue the aspirin 81mg daily  Continue the blood pressure medication  Start lipitor 40mg daily  From neurology standpoint patient is okay to return to full duty

## 2023-10-02 NOTE — LETTER
October 2, 2023      [facility]  [address]  [place] [address]  Phone: [phone]  Fax: [phone]       Patient: Julio Hummel   YOB: 1960  Date of Visit: 10/02/2023    To Whom It May Concern:    Sakshi Hummel  was at [facility] on 10/02/2023. The patient may return to work/school on 10/3/2023 with no restrictions. If you have any questions or concerns, or if I can be of further assistance, please do not hesitate to contact me.    Sincerely,    EVITA Adrian

## 2023-10-02 NOTE — PROGRESS NOTES
Subjective:       Patient ID: Julio Hummel is a 63 y.o. male     Chief Complaint:    Chief Complaint   Patient presents with    Follow-up     Hospital f/u        Allergies:  Macrolide antibiotics, Cephalosporins, Doxy, Pcn [penicillins], and Sulfa (sulfonamide antibiotics)    Current Medications:    Outpatient Encounter Medications as of 10/2/2023   Medication Sig Dispense Refill    aspirin (ECOTRIN) 81 MG EC tablet Take 81 mg by mouth once daily.      fluticasone propionate (FLONASE) 50 mcg/actuation nasal spray 1 spray by Each Nostril route once daily.      [DISCONTINUED] lisinopriL-hydrochlorothiazide (PRINZIDE,ZESTORETIC) 10-12.5 mg per tablet Take 0.5 tablets by mouth every other day. 45 tablet 3    atorvastatin (LIPITOR) 40 MG tablet Take 1 tablet (40 mg total) by mouth once daily. 30 tablet 3     No facility-administered encounter medications on file as of 10/2/2023.       History of Present Illness  64 y/o male new referral to neurology for possible TIA symptoms    He was seen in the ED on 9/28/23 with reported symptoms of intermittent left facial tingling, but denied lateralizing weakness.  Workup there included CT head which was negative.    Prior history of TIA in 2012    Left facial tingling on 9/28/23, he reported symptoms of tingling in the left face initially, but then also in the LUE and LLE.  The symptoms lasted fully about 8 hours, though today he does report still with slight tingling in the left face and the left forearm, but no focal weakness.  On exam diminished vibration and fine touch to the left elbow and forearm.  No other focal deficits noted.  Prior TIA was in 2012, near syncope events that stopped when put on ASA daily, he took this for 10 years with no further events until this week.  He is now back on the ASA, also on HTN management, but no cholesterol medication.  Last lipid panel a year ago showed , out goal, given TIA is actually 70 so will start lipitor, but plan to get  updated lipid panel.           Review of Systems  Review of Systems   Constitutional:  Negative for diaphoresis and fever.   HENT:  Negative for congestion, hearing loss and tinnitus.    Eyes:  Negative for blurred vision, double vision, photophobia, discharge and redness.   Respiratory:  Negative for cough and shortness of breath.    Cardiovascular:  Negative for chest pain.   Gastrointestinal:  Negative for abdominal pain, nausea and vomiting.   Musculoskeletal:  Negative for back pain, joint pain, myalgias and neck pain.   Skin:  Negative for itching and rash.   Neurological:  Positive for tingling and weakness. Negative for dizziness, tremors, sensory change, speech change, focal weakness, seizures, loss of consciousness and headaches.   Psychiatric/Behavioral:  Negative for depression, hallucinations and memory loss. The patient does not have insomnia.    All other systems reviewed and are negative.     Objective:     NEUROLOGICAL EXAMINATION:     MENTAL STATUS   Oriented to person, place, and time.   Attention: normal. Concentration: normal.   Speech: speech is normal   Level of consciousness: alert  Knowledge: good and consistent with education.   Normal comprehension.     CRANIAL NERVES     CN II   Visual fields full to confrontation.   Visual acuity: normal  Right visual field deficit: none  Left visual field deficit: none     CN III, IV, VI   Pupils are equal, round, and reactive to light.  Extraocular motions are normal.   Right pupil: Size: 3 mm. Shape: regular. Reactivity: brisk. Consensual response: intact. Accommodation: intact.   Left pupil: Size: 3 mm. Shape: regular. Reactivity: brisk. Consensual response: intact. Accommodation: intact.   CN III: no CN III palsy  CN VI: no CN VI palsy  Nystagmus: none   Diplopia: none  Upgaze: normal  Downgaze: normal  Conjugate gaze: present  Vestibulo-ocular reflex: present    CN V   Facial sensation intact.   Right facial sensation deficit: none  Left facial  sensation deficit: none  Right corneal reflex: normal  Left corneal reflex: normal    CN VII   Facial expression full, symmetric.   Right facial weakness: none  Left facial weakness: none  Right taste: normal  Left taste: normal    CN VIII   CN VIII normal.   Hearing: intact    CN IX, X   CN IX normal.   CN X normal.   Palate: symmetric    CN XI   CN XI normal.   Right sternocleidomastoid strength: normal  Left sternocleidomastoid strength: normal  Right trapezius strength: normal  Left trapezius strength: normal    CN XII   CN XII normal.   Tongue: not atrophic  Fasciculations: absent  Tongue deviation: none    MOTOR EXAM   Muscle bulk: normal  Overall muscle tone: normal  Right arm tone: normal  Left arm tone: normal  Right arm pronator drift: absent  Left arm pronator drift: absent  Right leg tone: normal  Left leg tone: normal    Strength   Right neck flexion: 5/5  Left neck flexion: 5/5  Right neck extension: 5/5  Left neck extension: 5/5  Right deltoid: 5/5  Left deltoid: 5/5  Right biceps: 5/5  Left biceps: 5/5  Right triceps: 5/5  Left triceps: 5/5  Right wrist flexion: 5/5  Left wrist flexion: 5/5  Right wrist extension: 5/5  Left wrist extension: 5/5  Right interossei: 5/5  Left interossei: 5/5  Right iliopsoas: 5/5  Left iliopsoas: 5/5  Right quadriceps: 5/5  Left quadriceps: 5/5  Right hamstrin/5  Left hamstrin/5  Right anterior tibial: 5/5  Left anterior tibial: 5/5  Right posterior tibial: 5/5  Left posterior tibial: 5/5  Right gastroc: 5/5  Left gastroc: 5/5    REFLEXES     Reflexes   Right brachioradialis: 2+  Left brachioradialis: 2+  Right biceps: 2+  Left biceps: 2+  Right triceps: 2+  Left triceps: 2+  Right patellar: 2+  Left patellar: 2+  Right achilles: 2+  Left achilles: 2+  Right plantar: normal  Left plantar: normal  Right Gill: absent  Left Gill: absent  Right ankle clonus: absent  Left ankle clonus: absent  Right pendular knee jerk: absent  Left pendular knee jerk:  absent    SENSORY EXAM   Right arm light touch: normal  Left arm light touch: decreased from elbow  Right leg light touch: normal  Left leg light touch: normal  Right arm vibration: normal  Left arm vibration: decreased from elbow  Right leg vibration: normal  Left leg vibration: normal  Right arm proprioception: normal  Left arm proprioception: decreased from elbow  Right leg proprioception: normal  Left leg proprioception: normal  Right arm pinprick: normal  Left arm pinprick: decreased from elbow  Right leg pinprick: normal  Left leg pinprick: normal    GAIT AND COORDINATION     Gait  Gait: normal     Coordination   Finger to nose coordination: normal  Heel to shin coordination: normal  Tandem walking coordination: normal    Tremor   Resting tremor: absent  Intention tremor: absent  Action tremor: absent       Physical Exam  Vitals and nursing note reviewed.   Constitutional:       Appearance: Normal appearance.   HENT:      Head: Normocephalic.   Eyes:      Extraocular Movements: EOM normal.      Pupils: Pupils are equal, round, and reactive to light.   Cardiovascular:      Rate and Rhythm: Normal rate and regular rhythm.      Pulses: Normal pulses.      Heart sounds: Normal heart sounds.   Pulmonary:      Effort: Pulmonary effort is normal.      Breath sounds: Normal breath sounds.   Musculoskeletal:         General: Normal range of motion.      Cervical back: Normal range of motion and neck supple.   Skin:     General: Skin is warm and dry.   Neurological:      General: No focal deficit present.      Mental Status: He is alert and oriented to person, place, and time.      Cranial Nerves: No cranial nerve deficit.      Sensory: Sensory deficit present.      Motor: No weakness.      Coordination: Coordination normal. Finger-Nose-Finger Test and Heel to Shin Test normal.      Gait: Gait is intact. Gait and tandem walk normal.      Deep Tendon Reflexes: Reflexes normal.      Reflex Scores:       Tricep reflexes  are 2+ on the right side and 2+ on the left side.       Bicep reflexes are 2+ on the right side and 2+ on the left side.       Brachioradialis reflexes are 2+ on the right side and 2+ on the left side.       Patellar reflexes are 2+ on the right side and 2+ on the left side.       Achilles reflexes are 2+ on the right side and 2+ on the left side.  Psychiatric:         Mood and Affect: Mood normal.         Speech: Speech normal.         Behavior: Behavior normal.          Assessment:     Problem List Items Addressed This Visit          Neuro    Transient cerebral ischemia - Primary    Relevant Orders    MRI Brain Without Contrast    MRA Brain without contrast    Echo    Antithrombin III    Comprehensive Metabolic Panel    Homocysteine, Serum    Protein S Antigen, Free    Protime-INR    Sickle Cell Screen       Cardiac/Vascular    Hypertension    Hyperlipidemia LDL goal <70    Relevant Medications    atorvastatin (LIPITOR) 40 MG tablet    Other Relevant Orders    Lipid Panel     Other Visit Diagnoses       Near syncope        Relevant Orders    Radiology US Carotid Bilateral    Vitamin B12 deficiency        Relevant Orders    Folate    Vitamin B12    Hypothyroidism, unspecified type        Relevant Orders    TSH             Primary Diagnosis and ICD10  Transient cerebral ischemia, unspecified type [G45.9]    Plan:     Patient Instructions   MRI brain  MRA brain  Carotid doppler  Echocardiogram  Continue the aspirin 81mg daily  Continue the blood pressure medication  Start lipitor 40mg daily  From neurology standpoint patient is okay to return to full duty    There are no discontinued medications.    Requested Prescriptions     Signed Prescriptions Disp Refills    atorvastatin (LIPITOR) 40 MG tablet 30 tablet 3     Sig: Take 1 tablet (40 mg total) by mouth once daily.       Orders Placed This Encounter   Procedures    MRI Brain Without Contrast    MRA Brain without contrast    Radiology US Carotid Bilateral     Antithrombin III    Comprehensive Metabolic Panel    Folate    Homocysteine, Serum    Protein S Antigen, Free    Protime-INR    Sickle Cell Screen    TSH    Vitamin B12    Lipid Panel    Echo

## 2023-10-02 NOTE — ASSESSMENT & PLAN NOTE
Increase Zestoretic to half tablet daily  Monitor BP and keep recordings.  Return to clinic in 3 mos or sooner if needed.

## 2023-10-02 NOTE — PROGRESS NOTES
Subjective:       Patient ID: Julio Hummel is a 63 y.o. male.    Chief Complaint: Results (Patient is in to discuss test results , patient is also in to get a referral to neurologist )    Mr. Hummel presents to clinic for ED discharge and lab review. Today he states his symptoms have resolved although he is still experiencing tingling to his left face. Reports numbness to left lower extremities started at ED but resolved quickly. He has Neuro appt scheduled today at 11 with Karan Mathew.    Active Problem List with Overview Notes    Diagnosis Date Noted    Hospital discharge follow-up 10/02/2023    Left facial numbness 10/02/2023    Hx of transient ischemic attack (TIA) 10/02/2023    Hyperglycemia 10/02/2023    Dizziness 09/29/2023    Essential hypertension 08/18/2022    Encounter for screening for malignant neoplasm of prostate 08/18/2022    Encounter for long-term (current) use of other medications 08/18/2022    Achilles tendinitis of right lower extremity 08/18/2022        Review of Systems   Constitutional:  Negative for activity change and unexpected weight change.   HENT:  Negative for hearing loss, rhinorrhea and trouble swallowing.    Eyes:  Negative for discharge and visual disturbance.   Respiratory:  Negative for chest tightness and wheezing.    Cardiovascular:  Negative for chest pain and palpitations.   Gastrointestinal:  Negative for blood in stool, constipation, diarrhea and vomiting.   Endocrine: Negative for polydipsia and polyuria.   Genitourinary:  Negative for difficulty urinating, hematuria and urgency.   Musculoskeletal:  Negative for arthralgias, joint swelling and neck pain.   Neurological:  Positive for numbness. Negative for facial asymmetry, weakness and headaches.   Psychiatric/Behavioral:  Negative for confusion and dysphoric mood.       CBC:  Recent Labs   Lab 08/18/22  1053 09/28/23  1351 09/28/23  1510   WBC 7.00 7.37 7.30   RBC 5.23 5.16 5.13   Hemoglobin 15.7 15.3 15.2   Hematocrit  45.6 45.8 45.6   Platelet Count 287 284 264   MCV 87.2 88.8 88.9   MCH 30.0 29.7 29.6   MCHC 34.4 33.4 33.3     CMP:  Recent Labs   Lab 08/18/22  1053 09/28/23  1351 09/28/23  1510   Glucose 90 105 110 H   Calcium 8.9 9.1 8.8   Albumin 4.2 4.0  --    Total Protein 7.8 7.8  --    Sodium 136 137 139   Potassium 4.0 4.0 4.3   CO2 27 27 30   Chloride 101 106 104   BUN 13 19 H 21 H   Creatinine 0.87 0.94 0.89   Alk Phos 61 79  --    ALT 24 43  --    AST 18 27  --    Bilirubin, Total 0.8 0.4  --        Objective:      Vitals:    10/02/23 0858   BP: (!) 154/87   Pulse: 75   Resp: 18      Physical Exam  Vitals reviewed.   Constitutional:       Appearance: Normal appearance.   HENT:      Head: Normocephalic.      Right Ear: External ear normal.      Left Ear: External ear normal.      Nose: Nose normal.      Mouth/Throat:      Mouth: Mucous membranes are moist.      Pharynx: Oropharynx is clear.   Eyes:      Pupils: Pupils are equal, round, and reactive to light.   Cardiovascular:      Rate and Rhythm: Normal rate and regular rhythm.      Pulses: Normal pulses.      Heart sounds: Normal heart sounds.   Pulmonary:      Effort: Pulmonary effort is normal.      Breath sounds: Normal breath sounds.   Abdominal:      General: Bowel sounds are normal.      Palpations: Abdomen is soft.   Musculoskeletal:         General: Normal range of motion.      Cervical back: Normal range of motion.   Skin:     General: Skin is warm and dry.   Neurological:      Mental Status: He is alert.   Psychiatric:         Mood and Affect: Mood normal.         Behavior: Behavior normal.       Assessment:       1. Essential hypertension    2. Hospital discharge follow-up    3. Left facial numbness    4. Hx of transient ischemic attack (TIA)    5. Hyperglycemia        Plan:     Follow up in 3 mos or sooner if needed.   Monitor BP and keep record of levels.  GO to ED with any numbness, speech changes, mental changes,  SOB, or CP. Etc. Patient and wife  understand.     Problem List Items Addressed This Visit          Neuro    Left facial numbness    Hx of transient ischemic attack (TIA)    Current Assessment & Plan     ASA 81 mg daily  Keep scheduled appt with Neuro today.            Cardiac/Vascular    Essential hypertension - Primary    Current Assessment & Plan     Increase Zestoretic to half tablet daily  Monitor BP and keep recordings.  Return to clinic in 3 mos or sooner if needed.         Relevant Medications    lisinopriL-hydrochlorothiazide (PRINZIDE,ZESTORETIC) 10-12.5 mg per tablet       Endocrine    Hyperglycemia    Relevant Orders    Hemoglobin A1C       Other    Hospital discharge follow-up    Current Assessment & Plan     Medications reconciled. Records reviewed.  All records reviewed and hospital med list reconciled.                Health Maintenance:  Health Maintenance Topics with due status: Not Due       Topic Last Completion Date    Colorectal Cancer Screening 06/21/2021    Lipid Panel 08/18/2022           [facility]   10/2/23

## 2023-10-02 NOTE — LETTER
Endocrine/General Surgery  [address] [place] [address]  Phone: [phone]  Fax: [phone] October 2, 2023     Patient: Julio Hummel   YOB: 1960   Date of Visit: 10/2/2023       To whom it may concern,    I saw @   today in clinic. Julio Shankar has been under my care since ***.    {HE SHE CAPITAL LETTER:72835} are clear to return to school/work on ***.  {HE SHE CAPITAL LETTER:64223} will be on light duty from *** until ***, at which point they can perform all duties without restriction.     If you have any questions or concerns, please don't hesitate to contact my office. Thank you for accomodating Julio Shankar during this time of {HE SHE CAPITAL LETTER:50402} treatment.        {ENDOSUR Letter Closings:96207}        EVITA Adrian        CC  No Recipients

## 2023-10-02 NOTE — ASSESSMENT & PLAN NOTE
Medications reconciled. Records reviewed.  All records reviewed and hospital med list reconciled.

## 2023-10-03 PROCEDURE — 85610 PROTHROMBIN TIME: CPT | Performed by: NURSE PRACTITIONER

## 2023-10-05 ENCOUNTER — TELEPHONE (OUTPATIENT)
Dept: FAMILY MEDICINE | Facility: CLINIC | Age: 63
End: 2023-10-05
Payer: OTHER GOVERNMENT

## 2023-10-05 DIAGNOSIS — G45.9 TRANSIENT CEREBRAL ISCHEMIA, UNSPECIFIED TYPE: Primary | ICD-10-CM

## 2023-10-09 ENCOUNTER — HOSPITAL ENCOUNTER (EMERGENCY)
Facility: HOSPITAL | Age: 63
Discharge: HOME | End: 2023-10-09
Payer: OTHER GOVERNMENT

## 2023-10-09 ENCOUNTER — HOSPITAL ENCOUNTER (OUTPATIENT)
Dept: CARDIOLOGY | Facility: HOSPITAL | Age: 63
Discharge: HOME | End: 2023-10-09
Attending: NURSE PRACTITIONER
Payer: OTHER GOVERNMENT

## 2023-10-09 VITALS
WEIGHT: 226 LBS | HEART RATE: 57 BPM | BODY MASS INDEX: 29.95 KG/M2 | TEMPERATURE: 98 F | RESPIRATION RATE: 18 BRPM | OXYGEN SATURATION: 96 % | HEIGHT: 73 IN | DIASTOLIC BLOOD PRESSURE: 75 MMHG | SYSTOLIC BLOOD PRESSURE: 135 MMHG

## 2023-10-09 VITALS — HEIGHT: 73 IN | BODY MASS INDEX: 29.95 KG/M2 | WEIGHT: 226 LBS

## 2023-10-09 DIAGNOSIS — I10 HYPERTENSION: ICD-10-CM

## 2023-10-09 DIAGNOSIS — R93.1 ABNORMAL ECHOCARDIOGRAM: ICD-10-CM

## 2023-10-09 DIAGNOSIS — I10 PRIMARY HYPERTENSION: Primary | ICD-10-CM

## 2023-10-09 DIAGNOSIS — G45.9 TRANSIENT CEREBRAL ISCHEMIA, UNSPECIFIED TYPE: ICD-10-CM

## 2023-10-09 DIAGNOSIS — F41.9 ANXIETY: ICD-10-CM

## 2023-10-09 DIAGNOSIS — I44.7 LBBB (LEFT BUNDLE BRANCH BLOCK): ICD-10-CM

## 2023-10-09 LAB
AORTIC ROOT ANNULUS: 3.09 CM
AV INDEX (PROSTH): 0.63
AV MEAN GRADIENT: 4 MMHG
AV PEAK GRADIENT: 8 MMHG
AV VALVE AREA BY VELOCITY RATIO: 1.8 CM²
AV VALVE AREA: 1.8 CM²
AV VELOCITY RATIO: 0.64
BSA FOR ECHO PROCEDURE: 2.3 M2
CV ECHO LV RWT: 0.25 CM
DOP CALC AO PEAK VEL: 1.37 M/S
DOP CALC AO VTI: 28.1 CM
DOP CALC LVOT AREA: 2.8 CM2
DOP CALC LVOT DIAMETER: 1.9 CM
DOP CALC LVOT PEAK VEL: 0.87 M/S
DOP CALC LVOT STROKE VOLUME: 50.44 CM3
DOP CALCLVOT PEAK VEL VTI: 17.8 CM
E WAVE DECELERATION TIME: 304.97 MSEC
E/A RATIO: 0.7
E/E' RATIO: 8.14 M/S
ECHO LV POSTERIOR WALL: 0.66 CM (ref 0.6–1.1)
EJECTION FRACTION: 40 %
FRACTIONAL SHORTENING: 25 % (ref 28–44)
INTERVENTRICULAR SEPTUM: 1.15 CM (ref 0.6–1.1)
IVC DIAMETER: 1.31 CM
LEFT ATRIUM SIZE: 2.6 CM
LEFT INTERNAL DIMENSION IN SYSTOLE: 4.01 CM (ref 2.1–4)
LEFT VENTRICLE DIASTOLIC VOLUME INDEX: 61.16 ML/M2
LEFT VENTRICLE DIASTOLIC VOLUME: 138.84 ML
LEFT VENTRICLE MASS INDEX: 79 G/M2
LEFT VENTRICLE SYSTOLIC VOLUME INDEX: 31.1 ML/M2
LEFT VENTRICLE SYSTOLIC VOLUME: 70.58 ML
LEFT VENTRICULAR INTERNAL DIMENSION IN DIASTOLE: 5.36 CM (ref 3.5–6)
LEFT VENTRICULAR MASS: 179.16 G
LV LATERAL E/E' RATIO: 7.13 M/S
LV SEPTAL E/E' RATIO: 9.5 M/S
LVOT MG: 1.79 MMHG
LVOT MV: 0.63 CM/S
MV PEAK A VEL: 0.81 M/S
MV PEAK E VEL: 0.57 M/S
PISA TR MAX VEL: 2.03 M/S
PV PEAK GRADIENT: 6 MMHG
PV PEAK VELOCITY: 1.27 M/S
RA PRESSURE ESTIMATED: 3 MMHG
RIGHT ATRIAL AREA: 13 CM2
RIGHT VENTRICULAR END-DIASTOLIC DIMENSION: 2.4 CM
RIGHT VENTRICULAR LENGTH IN DIASTOLE (APICAL 4-CHAMBER VIEW): 6.93 CM
RV MID DIAMA: 2.59 CM
RV TB RVSP: 5 MMHG
TDI LATERAL: 0.08 M/S
TDI SEPTAL: 0.06 M/S
TDI: 0.07 M/S
TR MAX PG: 16 MMHG
TRICUSPID ANNULAR PLANE SYSTOLIC EXCURSION: 2.27 CM
TROPONIN I SERPL DL<=0.01 NG/ML-MCNC: 7.3 PG/ML
TV REST PULMONARY ARTERY PRESSURE: 19 MMHG
Z-SCORE OF LEFT VENTRICULAR DIMENSION IN END DIASTOLE: -4.54
Z-SCORE OF LEFT VENTRICULAR DIMENSION IN END SYSTOLE: -1.9

## 2023-10-09 PROCEDURE — 96374 THER/PROPH/DIAG INJ IV PUSH: CPT

## 2023-10-09 PROCEDURE — 99284 EMERGENCY DEPT VISIT MOD MDM: CPT | Mod: 25

## 2023-10-09 PROCEDURE — 99284 PR EMERGENCY DEPT VISIT,LEVEL IV: ICD-10-PCS | Mod: ,,, | Performed by: NURSE PRACTITIONER

## 2023-10-09 PROCEDURE — 93010 ELECTROCARDIOGRAM REPORT: CPT | Mod: ,,, | Performed by: INTERNAL MEDICINE

## 2023-10-09 PROCEDURE — 93306 TTE W/DOPPLER COMPLETE: CPT

## 2023-10-09 PROCEDURE — 99284 EMERGENCY DEPT VISIT MOD MDM: CPT | Mod: ,,, | Performed by: NURSE PRACTITIONER

## 2023-10-09 PROCEDURE — 93306 TTE W/DOPPLER COMPLETE: CPT | Mod: 26,,, | Performed by: STUDENT IN AN ORGANIZED HEALTH CARE EDUCATION/TRAINING PROGRAM

## 2023-10-09 PROCEDURE — 93306 ECHO (CUPID ONLY): ICD-10-PCS | Mod: 26,,, | Performed by: STUDENT IN AN ORGANIZED HEALTH CARE EDUCATION/TRAINING PROGRAM

## 2023-10-09 PROCEDURE — 84484 ASSAY OF TROPONIN QUANT: CPT | Performed by: NURSE PRACTITIONER

## 2023-10-09 PROCEDURE — 93010 EKG 12-LEAD: ICD-10-PCS | Mod: ,,, | Performed by: INTERNAL MEDICINE

## 2023-10-09 PROCEDURE — 93005 ELECTROCARDIOGRAM TRACING: CPT

## 2023-10-09 RX ORDER — LOSARTAN POTASSIUM 50 MG/1
50 TABLET ORAL DAILY
Qty: 30 TABLET | Refills: 0 | Status: SHIPPED | OUTPATIENT
Start: 2023-10-09 | End: 2023-10-30 | Stop reason: SDUPTHER

## 2023-10-09 RX ORDER — HYDRALAZINE HYDROCHLORIDE 20 MG/ML
10 INJECTION INTRAMUSCULAR; INTRAVENOUS
Status: DISCONTINUED | OUTPATIENT
Start: 2023-10-09 | End: 2023-10-10 | Stop reason: HOSPADM

## 2023-10-10 NOTE — ED PROVIDER NOTES
Encounter Date: 10/9/2023       History     Chief Complaint   Patient presents with    Hypertension     Pt states his pressure has been running high since he had a transthoracic echocardiogram today - pt states at home pressure was 150-160 systolic and 80-90 diastolic - pt states he had to double his lisinopril earlier tonight - pt states had TIA 9/28/23     62 y/o male presents to ED with c/o high blood pressure. States he was recently diagnosed with TIA, is scheduled for MRI next week, being followed by neurology, and was concerned with his blood pressure being high (150s/90s at home). Recently increased his lisinopril to 5mg daily, then 10mg daily but took 20mg daily and BP is still high. Also states he has an appointment to see Dr. Pryor November 14 and had his echo done today. He denies chest pain, sob, vision changes, dizziness or headache. States he intermittently get numbness to left face/neck and upper left arm.        Review of patient's allergies indicates:   Allergen Reactions    Macrolide antibiotics     Cephalosporins Rash    Doxy Rash    Pcn [penicillins] Rash    Sulfa (sulfonamide antibiotics) Rash     History reviewed. No pertinent past medical history.  History reviewed. No pertinent surgical history.  History reviewed. No pertinent family history.  Social History     Tobacco Use    Smoking status: Never    Smokeless tobacco: Never     Review of Systems   Constitutional:  Negative for chills and fever.   HENT: Negative.     Eyes: Negative.    Respiratory:  Negative for cough, chest tightness and shortness of breath.    Gastrointestinal:  Negative for abdominal distention, nausea and vomiting.   Genitourinary: Negative.    Neurological:  Positive for tremors and numbness. Negative for dizziness, syncope, facial asymmetry, speech difficulty, weakness and headaches.   Psychiatric/Behavioral:  The patient is nervous/anxious.        Physical Exam     Initial Vitals [10/09/23 2055]   BP Pulse Resp Temp  SpO2   (!) 172/92 78 18 98.2 °F (36.8 °C) 98 %      MAP       --         Physical Exam    Constitutional: He appears well-developed. He is not diaphoretic. No distress.   Eyes: Conjunctivae and EOM are normal. Pupils are equal, round, and reactive to light.   Neck: Neck supple.   Normal range of motion.  Cardiovascular:  Normal rate, regular rhythm and normal heart sounds.           No murmur heard.  Pulmonary/Chest: Breath sounds normal. He has no wheezes. He has no rhonchi. He has no rales.   Abdominal: Abdomen is soft. Bowel sounds are normal.   Musculoskeletal:      Cervical back: Normal range of motion and neck supple.     Neurological: He is oriented to person, place, and time. He has normal strength. No cranial nerve deficit or sensory deficit.   Skin: Skin is warm and dry. Capillary refill takes less than 2 seconds. No erythema. No pallor.         Medical Screening Exam   See Full Note    ED Course   Procedures  Labs Reviewed   TROPONIN I - Normal          Imaging Results    None          Medications - No data to display    Medical Decision Making  Amount and/or Complexity of Data Reviewed  External Data Reviewed: labs, radiology and ECG.  Labs: ordered. Decision-making details documented in ED Course.  ECG/medicine tests: ordered. Decision-making details documented in ED Course.    Risk  Prescription drug management.         APC / Resident Notes:   Patient educated on effects of blood pressure medication and time it requires to appreciate the effectiveness of medication. Decision made to discontinue lisinopril/hctz that he has been doubling on his own and change to Losartan 50mg daily. Instructed to take it once daily for 2 weeks and to only check his blood pressure once a day after sitting for 10-15 minutes. If systolic BP remains > 140 he can increase to 100mg daily but will need to follow up for a refill and have lab drawn to recheck his creatinine. He verbalized understanding. After educating pateint,  we rechecked his BP and it was down to 135/75. I feel his anxiety is a factor of his elevated BP.    His echo and previous EKG was reviewed and abnormal. I will attempt to get him in to see cardiology sooner that Nov 14.     Signs and symptoms that would warrant immediate medical attention were discussed in detail with patient, including stroke symptoms. He verbalized understanding and states he feels much better now.          ED Course as of 10/10/23 1711   Mon Oct 09, 2023   2236 EKG SB with LBBB, no significant change when compared to previous [AM]   2312 Troponin negative, 7.3  BP improved, now 135/75 [AM]      ED Course User Index  [AM] Florence Bee FNP                    Clinical Impression:   Final diagnoses:  [I10] Hypertension  [I10] Primary hypertension (Primary)  [F41.9] Anxiety  [R93.1] Abnormal echocardiogram  [I44.7] LBBB (left bundle branch block)  [G45.9] Transient cerebral ischemia, unspecified type        ED Disposition Condition    Discharge Good          ED Prescriptions       Medication Sig Dispense Start Date End Date Auth. Provider    losartan (COZAAR) 50 MG tablet Take 1 tablet (50 mg total) by mouth once daily. 30 tablet 10/9/2023 10/8/2024 Florence Bee FNP          Follow-up Information       Follow up With Specialties Details Why Contact Info    Valarie Pryor MD Cardiology In 1 week  [address] [facility] [address]  [phone]      [facility] Emergency Department Emergency Medicine  If symptoms worsen, As needed [address] [facility] [address] [phone]             Florence Bee FNP  10/10/23 1712

## 2023-10-16 ENCOUNTER — TELEPHONE (OUTPATIENT)
Dept: NEUROLOGY | Facility: CLINIC | Age: 63
End: 2023-10-16
Payer: OTHER GOVERNMENT

## 2023-10-16 ENCOUNTER — HOSPITAL ENCOUNTER (OUTPATIENT)
Dept: RADIOLOGY | Facility: HOSPITAL | Age: 63
Discharge: HOME | End: 2023-10-16
Attending: NURSE PRACTITIONER
Payer: OTHER GOVERNMENT

## 2023-10-16 DIAGNOSIS — G45.9 TRANSIENT CEREBRAL ISCHEMIA, UNSPECIFIED TYPE: ICD-10-CM

## 2023-10-16 DIAGNOSIS — R55 NEAR SYNCOPE: ICD-10-CM

## 2023-10-16 PROCEDURE — 70544 MR ANGIOGRAPHY HEAD W/O DYE: CPT | Mod: 26,59,, | Performed by: STUDENT IN AN ORGANIZED HEALTH CARE EDUCATION/TRAINING PROGRAM

## 2023-10-16 PROCEDURE — 70551 MRI BRAIN STEM W/O DYE: CPT | Mod: 26,,, | Performed by: RADIOLOGY

## 2023-10-16 PROCEDURE — 70544 MR ANGIOGRAPHY HEAD W/O DYE: CPT | Mod: TC,59

## 2023-10-16 PROCEDURE — 70551 MRI BRAIN STEM W/O DYE: CPT | Mod: TC

## 2023-10-16 PROCEDURE — 93880 US CAROTID BILATERAL: ICD-10-PCS | Mod: 26,,, | Performed by: RADIOLOGY

## 2023-10-16 PROCEDURE — 70544 MRA BRAIN WITHOUT CONTRAST: ICD-10-PCS | Mod: 26,59,, | Performed by: STUDENT IN AN ORGANIZED HEALTH CARE EDUCATION/TRAINING PROGRAM

## 2023-10-16 PROCEDURE — 70551 MRI BRAIN WITHOUT CONTRAST: ICD-10-PCS | Mod: 26,,, | Performed by: RADIOLOGY

## 2023-10-16 PROCEDURE — 93880 EXTRACRANIAL BILAT STUDY: CPT | Mod: TC

## 2023-10-16 PROCEDURE — 93880 EXTRACRANIAL BILAT STUDY: CPT | Mod: 26,,, | Performed by: RADIOLOGY

## 2023-10-16 NOTE — TELEPHONE ENCOUNTER
Pt voiced understanding.    ----- Message from EVITA Adrian sent at 10/16/2023  3:10 PM CDT -----  MRI brain showed no stroke, just chronic microvascular changes.  MRA brain he has no aneurysms or significant plaque buildup, carotid doppler study did not show any significant blockage of the carotid arteries in his neck.

## 2023-10-30 ENCOUNTER — OFFICE VISIT (OUTPATIENT)
Dept: NEUROLOGY | Facility: CLINIC | Age: 63
End: 2023-10-30
Payer: OTHER GOVERNMENT

## 2023-10-30 VITALS
HEIGHT: 73 IN | WEIGHT: 232 LBS | DIASTOLIC BLOOD PRESSURE: 90 MMHG | HEART RATE: 77 BPM | SYSTOLIC BLOOD PRESSURE: 154 MMHG | BODY MASS INDEX: 30.75 KG/M2 | OXYGEN SATURATION: 97 %

## 2023-10-30 DIAGNOSIS — G45.9 TIA (TRANSIENT ISCHEMIC ATTACK): Primary | ICD-10-CM

## 2023-10-30 DIAGNOSIS — G45.9 TRANSIENT CEREBRAL ISCHEMIA, UNSPECIFIED TYPE: ICD-10-CM

## 2023-10-30 DIAGNOSIS — I10 HYPERTENSION, UNSPECIFIED TYPE: ICD-10-CM

## 2023-10-30 PROCEDURE — 99213 PR OFFICE/OUTPT VISIT, EST, LEVL III, 20-29 MIN: ICD-10-PCS | Mod: S$PBB,,, | Performed by: NURSE PRACTITIONER

## 2023-10-30 PROCEDURE — 99213 OFFICE O/P EST LOW 20 MIN: CPT | Mod: S$PBB,,, | Performed by: NURSE PRACTITIONER

## 2023-10-30 PROCEDURE — 99214 OFFICE O/P EST MOD 30 MIN: CPT | Mod: PBBFAC | Performed by: NURSE PRACTITIONER

## 2023-10-30 RX ORDER — LOSARTAN POTASSIUM 50 MG/1
50 TABLET ORAL DAILY
Qty: 30 TABLET | Refills: 0 | Status: SHIPPED | OUTPATIENT
Start: 2023-10-30 | End: 2023-11-13 | Stop reason: SDUPTHER

## 2023-10-30 NOTE — PATIENT INSTRUCTIONS
Unable to tolerate triptan  Continue current blood pressure medications  Keep followup with cardiology    Stroke risk modifiers:  1. Good sleep habits, recommend at least 7 hours total sleep daily  2. Continue management of blood pressure and cholesterol including medications, exercise, and good eating habits  3. Exercise as much as possible, recommend 5 days of the week for 30 minutes each day  4. Avoid smoking and alcohol  5. Go to the nearest emergency department immediately if any new or worsening weakness, speech difficulty, or numbness

## 2023-10-30 NOTE — PROGRESS NOTES
Subjective:       Patient ID: Julio Hummel is a 63 y.o. male     Chief Complaint:    No chief complaint on file.       Allergies:  Macrolide antibiotics, Cephalosporins, Doxy, Pcn [penicillins], and Sulfa (sulfonamide antibiotics)    Current Medications:    Outpatient Encounter Medications as of 10/30/2023   Medication Sig Dispense Refill    aspirin (ECOTRIN) 81 MG EC tablet Take 81 mg by mouth once daily.      atorvastatin (LIPITOR) 40 MG tablet Take 1 tablet (40 mg total) by mouth once daily. 30 tablet 3    fluticasone propionate (FLONASE) 50 mcg/actuation nasal spray 1 spray by Each Nostril route once daily.      losartan (COZAAR) 50 MG tablet Take 1 tablet (50 mg total) by mouth once daily. 30 tablet 0    [DISCONTINUED] lisinopriL-hydrochlorothiazide (PRINZIDE,ZESTORETIC) 10-12.5 mg per tablet Take 0.5 tablets by mouth once daily. 45 tablet 3    [DISCONTINUED] hydrALAZINE injection 10 mg        No facility-administered encounter medications on file as of 10/30/2023.       History of Present Illness  64 y/o male following in neurology for TIA symptoms.    Prior history of TIA in 2012    Left facial tingling on 9/28/23, he reported symptoms of tingling in the left face initially, but then also in the LUE and LLE.  The symptoms lasted fully about 8 hours, was seen in the ED with negative CT head.  However, he stated symptoms of still having tingling in the left face and left forearm on exam here in clinic, but denied weakness.  On exam diminished vibration and fine touch to the left elbow and forearm.  No other focal deficits noted.  Prior TIA was in 2012, near syncope events that stopped when put on ASA daily, he took this for 10 years with no further events until this most recent referral.  He is now back on the ASA, also on HTN management, but no cholesterol medication. I obtained lipid panel which showed LDL of 70, though I had started low dose lipitor at his initial visit due to prior LDL of 115.   Echo obatined 10/9/23 showed EF 40-50%, carotid doppler negative, MRI brain showed mild microvascular changes but no CVA.  MRA brain negative as well.  He was referred to cardiology as well for evaluation, to see Dr. Pryor in November.         Review of Systems  Review of Systems   Constitutional:  Negative for diaphoresis and fever.   HENT:  Negative for congestion, hearing loss and tinnitus.    Eyes:  Negative for blurred vision, double vision, photophobia, discharge and redness.   Respiratory:  Negative for cough and shortness of breath.    Cardiovascular:  Negative for chest pain.   Gastrointestinal:  Negative for abdominal pain, nausea and vomiting.   Musculoskeletal:  Negative for back pain, joint pain, myalgias and neck pain.   Skin:  Negative for itching and rash.   Neurological:  Positive for tingling and weakness. Negative for dizziness, tremors, sensory change, speech change, focal weakness, seizures, loss of consciousness and headaches.   Psychiatric/Behavioral:  Negative for depression, hallucinations and memory loss. The patient does not have insomnia.    All other systems reviewed and are negative.     Objective:     NEUROLOGICAL EXAMINATION:     MENTAL STATUS   Oriented to person, place, and time.   Attention: normal. Concentration: normal.   Speech: speech is normal   Level of consciousness: alert  Knowledge: good and consistent with education.   Normal comprehension.     CRANIAL NERVES     CN II   Visual fields full to confrontation.   Visual acuity: normal  Right visual field deficit: none  Left visual field deficit: none     CN III, IV, VI   Pupils are equal, round, and reactive to light.  Extraocular motions are normal.   Right pupil: Size: 3 mm. Shape: regular. Reactivity: brisk. Consensual response: intact. Accommodation: intact.   Left pupil: Size: 3 mm. Shape: regular. Reactivity: brisk. Consensual response: intact. Accommodation: intact.   CN III: no CN III palsy  CN VI: no CN VI  palsy  Nystagmus: none   Diplopia: none  Upgaze: normal  Downgaze: normal  Conjugate gaze: present  Vestibulo-ocular reflex: present    CN V   Facial sensation intact.   Right facial sensation deficit: none  Left facial sensation deficit: none  Right corneal reflex: normal  Left corneal reflex: normal    CN VII   Facial expression full, symmetric.   Right facial weakness: none  Left facial weakness: none  Right taste: normal  Left taste: normal    CN VIII   CN VIII normal.   Hearing: intact    CN IX, X   CN IX normal.   CN X normal.   Palate: symmetric    CN XI   CN XI normal.   Right sternocleidomastoid strength: normal  Left sternocleidomastoid strength: normal  Right trapezius strength: normal  Left trapezius strength: normal    CN XII   CN XII normal.   Tongue: not atrophic  Fasciculations: absent  Tongue deviation: none    MOTOR EXAM   Muscle bulk: normal  Overall muscle tone: normal  Right arm tone: normal  Left arm tone: normal  Right arm pronator drift: absent  Left arm pronator drift: absent  Right leg tone: normal  Left leg tone: normal    Strength   Right neck flexion: 5/5  Left neck flexion: 5/5  Right neck extension: 5/5  Left neck extension: 5/5  Right deltoid: 5/5  Left deltoid: 5/5  Right biceps: 5/5  Left biceps: 5/5  Right triceps: 5/5  Left triceps: 5/5  Right wrist flexion: 5/5  Left wrist flexion: 5/5  Right wrist extension: 5/5  Left wrist extension: 5/5  Right interossei: 5/5  Left interossei: 5/5  Right iliopsoas: 5/5  Left iliopsoas: 5/5  Right quadriceps: 5/5  Left quadriceps: 5/5  Right hamstrin/5  Left hamstrin/5  Right anterior tibial: 5/5  Left anterior tibial: 5/5  Right posterior tibial: 5/5  Left posterior tibial: 5/5  Right gastroc: 5/5  Left gastroc: 5/5    REFLEXES     Reflexes   Right brachioradialis: 2+  Left brachioradialis: 2+  Right biceps: 2+  Left biceps: 2+  Right triceps: 2+  Left triceps: 2+  Right patellar: 2+  Left patellar: 2+  Right achilles: 2+  Left  achilles: 2+  Right plantar: normal  Left plantar: normal  Right Gill: absent  Left Gill: absent  Right ankle clonus: absent  Left ankle clonus: absent  Right pendular knee jerk: absent  Left pendular knee jerk: absent    SENSORY EXAM   Right arm light touch: normal  Left arm light touch: decreased from elbow  Right leg light touch: normal  Left leg light touch: normal  Right arm vibration: normal  Left arm vibration: decreased from elbow  Right leg vibration: normal  Left leg vibration: normal  Right arm proprioception: normal  Left arm proprioception: decreased from elbow  Right leg proprioception: normal  Left leg proprioception: normal  Right arm pinprick: normal  Left arm pinprick: decreased from elbow  Right leg pinprick: normal  Left leg pinprick: normal    GAIT AND COORDINATION     Gait  Gait: normal     Coordination   Finger to nose coordination: normal  Heel to shin coordination: normal  Tandem walking coordination: normal    Tremor   Resting tremor: absent  Intention tremor: absent  Action tremor: absent       Physical Exam  Vitals and nursing note reviewed.   Constitutional:       Appearance: Normal appearance.   HENT:      Head: Normocephalic.   Eyes:      Extraocular Movements: EOM normal.      Pupils: Pupils are equal, round, and reactive to light.   Cardiovascular:      Rate and Rhythm: Normal rate and regular rhythm.      Pulses: Normal pulses.      Heart sounds: Normal heart sounds.   Pulmonary:      Effort: Pulmonary effort is normal.      Breath sounds: Normal breath sounds.   Musculoskeletal:         General: Normal range of motion.      Cervical back: Normal range of motion and neck supple.   Skin:     General: Skin is warm and dry.   Neurological:      General: No focal deficit present.      Mental Status: He is alert and oriented to person, place, and time.      Cranial Nerves: No cranial nerve deficit.      Sensory: Sensory deficit present.      Motor: No weakness.      Coordination:  Coordination normal. Finger-Nose-Finger Test and Heel to Shin Test normal.      Gait: Gait is intact. Gait and tandem walk normal.      Deep Tendon Reflexes: Reflexes normal.      Reflex Scores:       Tricep reflexes are 2+ on the right side and 2+ on the left side.       Bicep reflexes are 2+ on the right side and 2+ on the left side.       Brachioradialis reflexes are 2+ on the right side and 2+ on the left side.       Patellar reflexes are 2+ on the right side and 2+ on the left side.       Achilles reflexes are 2+ on the right side and 2+ on the left side.  Psychiatric:         Mood and Affect: Mood normal.         Speech: Speech normal.         Behavior: Behavior normal.        Assessment:     Problem List Items Addressed This Visit    None         Primary Diagnosis and ICD10  No primary diagnosis found.    Plan:     There are no Patient Instructions on file for this visit.    There are no discontinued medications.    Requested Prescriptions      No prescriptions requested or ordered in this encounter       No orders of the defined types were placed in this encounter.

## 2023-11-01 DIAGNOSIS — I10 HYPERTENSION, UNSPECIFIED TYPE: ICD-10-CM

## 2023-11-02 RX ORDER — LOSARTAN POTASSIUM 50 MG/1
50 TABLET ORAL
Qty: 90 TABLET | OUTPATIENT
Start: 2023-11-02

## 2023-11-13 ENCOUNTER — OFFICE VISIT (OUTPATIENT)
Dept: CARDIOLOGY | Facility: CLINIC | Age: 63
End: 2023-11-13
Payer: OTHER GOVERNMENT

## 2023-11-13 VITALS
DIASTOLIC BLOOD PRESSURE: 90 MMHG | OXYGEN SATURATION: 96 % | BODY MASS INDEX: 30.75 KG/M2 | HEART RATE: 78 BPM | WEIGHT: 232 LBS | SYSTOLIC BLOOD PRESSURE: 160 MMHG | HEIGHT: 73 IN

## 2023-11-13 DIAGNOSIS — I10 ESSENTIAL HYPERTENSION: ICD-10-CM

## 2023-11-13 DIAGNOSIS — Z86.73 HX OF TRANSIENT ISCHEMIC ATTACK (TIA): ICD-10-CM

## 2023-11-13 DIAGNOSIS — I44.7 LBBB (LEFT BUNDLE BRANCH BLOCK): ICD-10-CM

## 2023-11-13 DIAGNOSIS — R94.31 ABNORMAL ELECTROCARDIOGRAM (ECG) (EKG): ICD-10-CM

## 2023-11-13 DIAGNOSIS — G45.9 TRANSIENT CEREBRAL ISCHEMIA, UNSPECIFIED TYPE: ICD-10-CM

## 2023-11-13 DIAGNOSIS — I10 HYPERTENSION, UNSPECIFIED TYPE: Primary | ICD-10-CM

## 2023-11-13 DIAGNOSIS — R93.1 ABNORMAL ECHOCARDIOGRAM: ICD-10-CM

## 2023-11-13 DIAGNOSIS — I51.89 MILD LEFT VENTRICULAR SYSTOLIC DYSFUNCTION (LVSD): ICD-10-CM

## 2023-11-13 PROCEDURE — 93010 ELECTROCARDIOGRAM REPORT: CPT | Mod: S$PBB,,, | Performed by: STUDENT IN AN ORGANIZED HEALTH CARE EDUCATION/TRAINING PROGRAM

## 2023-11-13 PROCEDURE — 99204 OFFICE O/P NEW MOD 45 MIN: CPT | Mod: S$PBB,,, | Performed by: STUDENT IN AN ORGANIZED HEALTH CARE EDUCATION/TRAINING PROGRAM

## 2023-11-13 PROCEDURE — 93005 ELECTROCARDIOGRAM TRACING: CPT | Mod: PBBFAC | Performed by: STUDENT IN AN ORGANIZED HEALTH CARE EDUCATION/TRAINING PROGRAM

## 2023-11-13 PROCEDURE — 93010 EKG 12-LEAD: ICD-10-PCS | Mod: S$PBB,,, | Performed by: STUDENT IN AN ORGANIZED HEALTH CARE EDUCATION/TRAINING PROGRAM

## 2023-11-13 PROCEDURE — 99215 OFFICE O/P EST HI 40 MIN: CPT | Mod: PBBFAC | Performed by: STUDENT IN AN ORGANIZED HEALTH CARE EDUCATION/TRAINING PROGRAM

## 2023-11-13 PROCEDURE — 99204 PR OFFICE/OUTPT VISIT, NEW, LEVL IV, 45-59 MIN: ICD-10-PCS | Mod: S$PBB,,, | Performed by: STUDENT IN AN ORGANIZED HEALTH CARE EDUCATION/TRAINING PROGRAM

## 2023-11-13 RX ORDER — CARVEDILOL 3.12 MG/1
3.12 TABLET ORAL 2 TIMES DAILY WITH MEALS
Qty: 60 TABLET | Refills: 11 | Status: SHIPPED | OUTPATIENT
Start: 2023-11-13 | End: 2024-11-12

## 2023-11-13 RX ORDER — LOSARTAN POTASSIUM 50 MG/1
50 TABLET ORAL DAILY
Qty: 30 TABLET | Refills: 11 | Status: SHIPPED | OUTPATIENT
Start: 2023-11-13 | End: 2023-12-06 | Stop reason: DRUGHIGH

## 2023-11-13 NOTE — PROGRESS NOTES
PCP: Debi Aleman FNP    Referring Provider:     Subjective:   Julio Hummel is a 63 y.o. male with hx of hy hypertension and possible TIAs who presents for a new patient visit.    Patient is referred to cardiology due to hypertension and hx of TIAs. Per neurology note: Left facial tingling on 9/28/23, he reported symptoms of tingling in the left face initially, but then also in the LUE and LLE.  The symptoms lasted fully about 8 hours, was seen in the ED with negative CT head.  However, he stated symptoms of still having tingling in the left face and left forearm on exam here in clinic, but denied weakness.  On exam diminished vibration and fine touch to the left elbow and forearm.  No other focal deficits noted.  Prior TIA was in 2012, near syncope events that stopped when put on ASA daily, he took this for 10 years with no further events until this most recent referral. Echo obatined 10/9/23 showed EF 40-50%, carotid doppler negative, MRI brain showed mild microvascular changes but no CVA.  MRA brain negative as well.  EKG shows LBBB, chronicity not known.  Denies chest pain/tightness, dyspnea, orthopnea, PND, leg edema, palpitations, lightheadedness or syncope.      Fhx:  No known family history of cardiac disease   Shx:  Never smoker    EKG 11/13/2023: Normal sinus rhythm, left bundle-branch block    10/9/23: NSR, LBBB   9/23/23: NSR, LBBB     ECHO Results for orders placed during the hospital encounter of 10/09/23    Echo    Interpretation Summary    Left Ventricle: The left ventricle is normal in size. Normal wall thickness. Global hypokinesis and regional wall motion abnormalities present. [place] and anteroseptal wall appears akinetic There is normal diastolic function.    Right Ventricle: Normal right ventricular cavity size. Systolic function is normal.    Aortic Valve: The aortic valve is a trileaflet valve.    Mitral Valve: There is mild regurgitation.    Pulmonary Artery: The estimated pulmonary  "artery systolic pressure is 19 mmHg.    IVC/SVC: Normal venous pressure at 3 mmHg.     CATH: No results found for this or any previous visit.     Lab Results   Component Value Date     10/03/2023    K 4.1 10/03/2023     (H) 10/03/2023    CO2 29 10/03/2023    BUN 18 10/03/2023    CREATININE 0.93 10/03/2023    CALCIUM 8.4 (L) 10/03/2023    ANIONGAP 8 10/03/2023       Lab Results   Component Value Date    CHOL 139 10/03/2023    CHOL 191 08/18/2022     Lab Results   Component Value Date    HDL 57 10/03/2023    HDL 60 08/18/2022     Lab Results   Component Value Date    LDLCALC 70 10/03/2023    LDLCALC 101 08/18/2022     Lab Results   Component Value Date    TRIG 62 10/03/2023    TRIG 149 08/18/2022     Lab Results   Component Value Date    CHOLHDL 2.4 10/03/2023    CHOLHDL 3.2 08/18/2022       Lab Results   Component Value Date    WBC 7.30 09/28/2023    HGB 15.2 09/28/2023    HCT 45.6 09/28/2023    MCV 88.9 09/28/2023     09/28/2023           Current Outpatient Medications:     aspirin (ECOTRIN) 81 MG EC tablet, Take 81 mg by mouth once daily., Disp: , Rfl:     fluticasone propionate (FLONASE) 50 mcg/actuation nasal spray, 1 spray by Each Nostril route once daily., Disp: , Rfl:     losartan (COZAAR) 50 MG tablet, Take 1 tablet (50 mg total) by mouth once daily., Disp: 30 tablet, Rfl: 0    Review of Systems   Constitutional:  Negative for chills, diaphoresis, fever and malaise/fatigue.   Respiratory:  Negative for cough and shortness of breath.    Cardiovascular:  Negative for chest pain, palpitations, orthopnea, claudication, leg swelling and PND.   Gastrointestinal:  Negative for abdominal pain, heartburn, nausea and vomiting.   Neurological:  Negative for dizziness.       Objective:   BP (!) 160/90 (BP Location: Left arm, Patient Position: Sitting)   Pulse 78   Ht 6' 1" (1.854 m)   Wt 105.2 kg (232 lb)   SpO2 96%   BMI 30.61 kg/m²     Physical Exam  Constitutional:       General: He is not in " acute distress.     Appearance: Normal appearance.   Cardiovascular:      Rate and Rhythm: Normal rate and regular rhythm.      Pulses: Normal pulses.      Heart sounds: Normal heart sounds. No murmur heard.     No friction rub. No gallop.   Pulmonary:      Effort: Pulmonary effort is normal.      Breath sounds: Normal breath sounds. No wheezing or rales.   Musculoskeletal:      Right lower leg: No edema.      Left lower leg: No edema.   Skin:     General: Skin is warm and dry.   Neurological:      Mental Status: He is alert.           Assessment:     1. Hypertension, unspecified type        2. Essential hypertension  Ambulatory referral/consult to Cardiology      3. Hx of transient ischemic attack (TIA)  Ambulatory referral/consult to Cardiology      4. Abnormal echocardiogram  Ambulatory referral/consult to Cardiology      5. LBBB (left bundle branch block)  Ambulatory referral/consult to Cardiology      6. Transient cerebral ischemia, unspecified type  Ambulatory referral/consult to Cardiology            Plan:   No problem-specific Assessment & Plan notes found for this encounter.    Left bundle-branch block   Mild LV systolic dysfunction  LVEF approximately 40% with regional wall motion abnormalities  NYHA class I, euvolemic on exam  Denies chest pain, dyspnea    Scheduled pharmacologic stress test with nuclear imaging for evaluation of cardiomyopathy- ischemic vs 2/2 LBBB  Start carvedilol 3.125 mg b.i.d..  Continue losartan 50 mg daily    Hypertension   Blood pressure above goal today (160/90)  Start Coreg 3.125 b.i.d. as above   If blood pressure remains elevated, increase losartan to 100 mg daily    History of TIA  30 day outpatient ambulatory cardiac monitor (MCOT) to assess for afib  Continue ASA 81 mg daily       Follow up in 1 month for GDMT uptitration

## 2023-11-13 NOTE — PATIENT INSTRUCTIONS
Start carvedilol 3.125 mg twice a day   Losartan 50 mg daily script sent to pharmacy  Stress test scheduled for ->December 1, 2023 at 8:00 am  30 day ambulatory cardiac monitor (MCOT) to be mailed in  Follow-up in 1 month in CHF clinic

## 2023-12-01 ENCOUNTER — HOSPITAL ENCOUNTER (OUTPATIENT)
Dept: CARDIOLOGY | Facility: HOSPITAL | Age: 63
Discharge: HOME | End: 2023-12-01
Attending: STUDENT IN AN ORGANIZED HEALTH CARE EDUCATION/TRAINING PROGRAM
Payer: OTHER GOVERNMENT

## 2023-12-01 ENCOUNTER — HOSPITAL ENCOUNTER (OUTPATIENT)
Dept: RADIOLOGY | Facility: HOSPITAL | Age: 63
Discharge: HOME | End: 2023-12-01
Attending: STUDENT IN AN ORGANIZED HEALTH CARE EDUCATION/TRAINING PROGRAM
Payer: OTHER GOVERNMENT

## 2023-12-01 VITALS
SYSTOLIC BLOOD PRESSURE: 146 MMHG | DIASTOLIC BLOOD PRESSURE: 78 MMHG | BODY MASS INDEX: 30.61 KG/M2 | HEIGHT: 73 IN | HEART RATE: 58 BPM

## 2023-12-01 DIAGNOSIS — R94.31 ABNORMAL ELECTROCARDIOGRAM (ECG) (EKG): ICD-10-CM

## 2023-12-01 DIAGNOSIS — I44.7 LBBB (LEFT BUNDLE BRANCH BLOCK): ICD-10-CM

## 2023-12-01 DIAGNOSIS — I51.89 MILD LEFT VENTRICULAR SYSTOLIC DYSFUNCTION (LVSD): ICD-10-CM

## 2023-12-01 LAB
CV STRESS BASE HR: 58 BPM
DIASTOLIC BLOOD PRESSURE: 78 MMHG
OHS CV CPX 85 PERCENT MAX PREDICTED HEART RATE MALE: 134
OHS CV CPX MAX PREDICTED HEART RATE: 158
OHS CV CPX PATIENT IS FEMALE: 0
OHS CV CPX PATIENT IS MALE: 1
OHS CV CPX PEAK DIASTOLIC BLOOD PRESSURE: 75 MMHG
OHS CV CPX PEAK HEAR RATE: 78 BPM
OHS CV CPX PEAK RATE PRESSURE PRODUCT: NORMAL
OHS CV CPX PEAK SYSTOLIC BLOOD PRESSURE: 151 MMHG
OHS CV CPX PERCENT MAX PREDICTED HEART RATE ACHIEVED: 49
OHS CV CPX RATE PRESSURE PRODUCT PRESENTING: 8468
SYSTOLIC BLOOD PRESSURE: 146 MMHG

## 2023-12-01 PROCEDURE — 78452 NM MYOCARDIAL PERFUSION SPECT MULTI PHARM: ICD-10-PCS | Mod: 26,,, | Performed by: STUDENT IN AN ORGANIZED HEALTH CARE EDUCATION/TRAINING PROGRAM

## 2023-12-01 PROCEDURE — 63600175 PHARM REV CODE 636 W HCPCS: Performed by: STUDENT IN AN ORGANIZED HEALTH CARE EDUCATION/TRAINING PROGRAM

## 2023-12-01 PROCEDURE — 93018 NUCLEAR STRESS TEST (CUPID ONLY): ICD-10-PCS | Mod: ,,, | Performed by: INTERNAL MEDICINE

## 2023-12-01 PROCEDURE — 93016 CV STRESS TEST SUPVJ ONLY: CPT | Mod: ,,, | Performed by: INTERNAL MEDICINE

## 2023-12-01 PROCEDURE — 93016 NUCLEAR STRESS TEST (CUPID ONLY): ICD-10-PCS | Mod: ,,, | Performed by: INTERNAL MEDICINE

## 2023-12-01 PROCEDURE — A9500 TC99M SESTAMIBI: HCPCS

## 2023-12-01 PROCEDURE — 78452 HT MUSCLE IMAGE SPECT MULT: CPT | Mod: 26,,, | Performed by: STUDENT IN AN ORGANIZED HEALTH CARE EDUCATION/TRAINING PROGRAM

## 2023-12-01 PROCEDURE — 78452 HT MUSCLE IMAGE SPECT MULT: CPT | Mod: TC

## 2023-12-01 PROCEDURE — 93018 CV STRESS TEST I&R ONLY: CPT | Mod: ,,, | Performed by: INTERNAL MEDICINE

## 2023-12-01 PROCEDURE — 93017 CV STRESS TEST TRACING ONLY: CPT

## 2023-12-01 RX ORDER — REGADENOSON 0.08 MG/ML
0.4 INJECTION, SOLUTION INTRAVENOUS ONCE
Status: COMPLETED | OUTPATIENT
Start: 2023-12-01 | End: 2023-12-01

## 2023-12-01 RX ADMIN — REGADENOSON 0.4 MG: 0.08 INJECTION, SOLUTION INTRAVENOUS at 10:12

## 2023-12-04 NOTE — H&P (VIEW-ONLY)
Please inform stress test is abnormal. Schedule visit this week 12/6 or 12/7 to discuss and schedule LHC. Thanks.

## 2023-12-06 ENCOUNTER — PATIENT MESSAGE (OUTPATIENT)
Dept: CARDIOLOGY | Facility: CLINIC | Age: 63
End: 2023-12-06
Payer: OTHER GOVERNMENT

## 2023-12-06 ENCOUNTER — OFFICE VISIT (OUTPATIENT)
Dept: CARDIOLOGY | Facility: CLINIC | Age: 63
End: 2023-12-06
Payer: OTHER GOVERNMENT

## 2023-12-06 VITALS
HEART RATE: 69 BPM | DIASTOLIC BLOOD PRESSURE: 80 MMHG | WEIGHT: 233.5 LBS | SYSTOLIC BLOOD PRESSURE: 140 MMHG | HEIGHT: 73 IN | OXYGEN SATURATION: 96 % | BODY MASS INDEX: 30.95 KG/M2

## 2023-12-06 DIAGNOSIS — I10 ESSENTIAL HYPERTENSION: ICD-10-CM

## 2023-12-06 DIAGNOSIS — I51.89 MILD LEFT VENTRICULAR SYSTOLIC DYSFUNCTION (LVSD): ICD-10-CM

## 2023-12-06 DIAGNOSIS — I44.7 LBBB (LEFT BUNDLE BRANCH BLOCK): ICD-10-CM

## 2023-12-06 DIAGNOSIS — Z86.73 HX OF TRANSIENT ISCHEMIC ATTACK (TIA): ICD-10-CM

## 2023-12-06 DIAGNOSIS — R94.39 ABNORMAL STRESS TEST: Primary | ICD-10-CM

## 2023-12-06 PROCEDURE — 99213 OFFICE O/P EST LOW 20 MIN: CPT | Mod: PBBFAC | Performed by: STUDENT IN AN ORGANIZED HEALTH CARE EDUCATION/TRAINING PROGRAM

## 2023-12-06 PROCEDURE — 99215 OFFICE O/P EST HI 40 MIN: CPT | Mod: S$PBB,,, | Performed by: STUDENT IN AN ORGANIZED HEALTH CARE EDUCATION/TRAINING PROGRAM

## 2023-12-06 PROCEDURE — 99215 PR OFFICE/OUTPT VISIT, EST, LEVL V, 40-54 MIN: ICD-10-PCS | Mod: S$PBB,,, | Performed by: STUDENT IN AN ORGANIZED HEALTH CARE EDUCATION/TRAINING PROGRAM

## 2023-12-06 RX ORDER — ISOSORBIDE MONONITRATE 30 MG/1
30 TABLET, EXTENDED RELEASE ORAL DAILY
Qty: 30 TABLET | Refills: 11 | Status: ON HOLD | OUTPATIENT
Start: 2023-12-06 | End: 2023-12-18

## 2023-12-06 RX ORDER — ROSUVASTATIN CALCIUM 10 MG/1
10 TABLET, COATED ORAL DAILY
Qty: 30 TABLET | Refills: 11 | Status: SHIPPED | OUTPATIENT
Start: 2023-12-06 | End: 2024-01-06 | Stop reason: ALTCHOICE

## 2023-12-06 RX ORDER — LOSARTAN POTASSIUM 100 MG/1
100 TABLET ORAL DAILY
Qty: 90 TABLET | Refills: 3 | Status: SHIPPED | OUTPATIENT
Start: 2023-12-06 | End: 2024-12-05

## 2023-12-06 RX ORDER — SODIUM CHLORIDE 0.9 % (FLUSH) 0.9 %
3 SYRINGE (ML) INJECTION EVERY 6 HOURS PRN
Status: CANCELLED | OUTPATIENT
Start: 2023-12-18

## 2023-12-06 NOTE — PATIENT INSTRUCTIONS
Increase losartan to 100 mg daily   Start imdur 30 mg daily   Start rosuvastatin 10 mg daily   Cardiac catheterization scheduled for ->  Follow-up in 6 weeks

## 2023-12-06 NOTE — PROGRESS NOTES
PCP: Debi Aleman FNP    Referring Provider:     Subjective:   Julio Hummel is a 63 y.o. male with hx of hy hypertension and possible TIAs who presents for a follow-up visit.     12/6/23:  Presents for follow up to discuss abnormal stress test results.  Large size medium intensity fixed mid to basal anteroseptal and mid to basal inferoseptal defects.  Denies chest pain or shortness of breath.  Continues to have vague episodes where he feels off. Usually when sitting. It is difficult to describe but there no dizziness or lightheadedness or focal neurologic changes during these episodes.     11/13/23: Patient is referred to cardiology due to hypertension and hx of TIAs. Per neurology note: Left facial tingling on 9/28/23, he reported symptoms of tingling in the left face initially, but then also in the LUE and LLE.  The symptoms lasted fully about 8 hours, was seen in the ED with negative CT head.  However, he stated symptoms of still having tingling in the left face and left forearm on exam here in clinic, but denied weakness.  On exam diminished vibration and fine touch to the left elbow and forearm.  No other focal deficits noted.  Prior TIA was in 2012, near syncope events that stopped when put on ASA daily, he took this for 10 years with no further events until this most recent referral. Echo obatined 10/9/23 showed EF 40-50%, carotid doppler negative, MRI brain showed mild microvascular changes but no CVA.  MRA brain negative as well.  EKG shows LBBB, chronicity not known.  Denies chest pain/tightness, dyspnea, orthopnea, PND, leg edema, palpitations, lightheadedness or syncope.      Fhx:  No known family history of cardiac disease   Shx:  Never smoker    EKG 11/13/2023: Normal sinus rhythm, left bundle-branch block    10/9/23: NSR, LBBB   9/23/23: NSR, LBBB     ECHO Results for orders placed during the hospital encounter of 10/09/23    Echo    Interpretation Summary    Left Ventricle: The left ventricle  is normal in size. Normal wall thickness. Global hypokinesis and regional wall motion abnormalities present. [place] and anteroseptal wall appears akinetic There is normal diastolic function.    Right Ventricle: Normal right ventricular cavity size. Systolic function is normal.    Aortic Valve: The aortic valve is a trileaflet valve.    Mitral Valve: There is mild regurgitation.    Pulmonary Artery: The estimated pulmonary artery systolic pressure is 19 mmHg.    IVC/SVC: Normal venous pressure at 3 mmHg.     CATH: No results found for this or any previous visit.    NM MPI 12/1/23:  Impression:     1. Large size, medium intensity fixed defect in the [id number]-ht-[id number] inferoseptal and usm-el-aeljl anteroseptal walls with decreased wall motion and thickening, suggestive of infarct.  2. the global left ventricular systolic function is reduced with an LV ejection fraction of 48 % and evidence of LV dilatation. Wall motion is impaired.        Lab Results   Component Value Date     10/03/2023    K 4.1 10/03/2023     (H) 10/03/2023    CO2 29 10/03/2023    BUN 18 10/03/2023    CREATININE 0.93 10/03/2023    CALCIUM 8.4 (L) 10/03/2023    ANIONGAP 8 10/03/2023       Lab Results   Component Value Date    CHOL 139 10/03/2023    CHOL 191 08/18/2022     Lab Results   Component Value Date    HDL 57 10/03/2023    HDL 60 08/18/2022     Lab Results   Component Value Date    LDLCALC 70 10/03/2023    LDLCALC 101 08/18/2022     Lab Results   Component Value Date    TRIG 62 10/03/2023    TRIG 149 08/18/2022     Lab Results   Component Value Date    CHOLHDL 2.4 10/03/2023    CHOLHDL 3.2 08/18/2022       Lab Results   Component Value Date    WBC 7.30 09/28/2023    HGB 15.2 09/28/2023    HCT 45.6 09/28/2023    MCV 88.9 09/28/2023     09/28/2023           Current Outpatient Medications:     aspirin (ECOTRIN) 81 MG EC tablet, Take 81 mg by mouth once daily., Disp: , Rfl:     carvediloL (COREG) 3.125 MG tablet, Take 1 tablet (3.125 mg  "total) by mouth 2 (two) times daily with meals., Disp: 60 tablet, Rfl: 11    fluticasone propionate (FLONASE) 50 mcg/actuation nasal spray, 1 spray by Each Nostril route once daily., Disp: , Rfl:     isosorbide mononitrate (IMDUR) 30 MG 24 hr tablet, Take 1 tablet (30 mg total) by mouth once daily., Disp: 30 tablet, Rfl: 11    losartan (COZAAR) 100 MG tablet, Take 1 tablet (100 mg total) by mouth once daily., Disp: 90 tablet, Rfl: 3    rosuvastatin (CRESTOR) 10 MG tablet, Take 1 tablet (10 mg total) by mouth once daily., Disp: 30 tablet, Rfl: 11    Review of Systems   Constitutional:  Negative for chills, diaphoresis, fever and malaise/fatigue.   Respiratory:  Negative for cough and shortness of breath.    Cardiovascular:  Negative for chest pain, palpitations, orthopnea, claudication, leg swelling and PND.   Gastrointestinal:  Negative for abdominal pain, heartburn, nausea and vomiting.   Neurological:  Negative for dizziness.       Objective:   BP (!) 140/80 (BP Location: Left arm, Patient Position: Sitting)   Pulse 69   Ht 6' 1" (1.854 m)   Wt 105.9 kg (233 lb 8 oz)   SpO2 96%   BMI 30.81 kg/m²     Physical Exam  Constitutional:       General: He is not in acute distress.     Appearance: Normal appearance.   Cardiovascular:      Rate and Rhythm: Normal rate and regular rhythm.      Pulses: Normal pulses.      Heart sounds: Normal heart sounds. No murmur heard.     No friction rub. No gallop.   Pulmonary:      Effort: Pulmonary effort is normal.      Breath sounds: Normal breath sounds. No wheezing or rales.   Musculoskeletal:      Right lower leg: No edema.      Left lower leg: No edema.   Skin:     General: Skin is warm and dry.   Neurological:      Mental Status: He is alert.           Assessment:     1. Abnormal stress test  Full code    Establish IV access and maintain saline lock    Height and weight    Void    Clip and Prep Right Groin, Left Groin    Verify informed consent    Vital signs    Cardiac " Monitoring - Adult    Pulse Oximetry Q4H    Diet NPO    sodium chloride 0.9% flush 3 mL    Case Request-Cath Lab: Left heart cath    Place in Outpatient    Case Request-Cath Lab: Left heart cath      2. LBBB (left bundle branch block)        3. Mild left ventricular systolic dysfunction (LVSD)        4. Essential hypertension        5. Hx of transient ischemic attack (TIA)                Plan:   No problem-specific Assessment & Plan notes found for this encounter.    Abnormal stress test (NM MPI)  Left bundle-branch block   Mild LV systolic dysfunction  LVEF on TTE approximately 40% with regional wall motion abnormalities  NYHA class I, euvolemic on exam  Denies chest pain, dyspnea  S/p Pharmacologic stress test with nuclear imaging for evaluation of cardiomyopathy- ischemic vs 2/2 LBBB  Found to have large size, medium intensity fixed defect in the mjs-lh-melfo inferoseptal and giv-wz-cficu anteroseptal walls with decreased wall motion and thickening, suggestive of infarct.    Plan:  - Schedule coronary angiography due to abnormal stress test suggestive of infarct.   - Risks vs benefits of coronary angiography discussed with patient. Consent signed. Tentatively scheduled with Dr. Meehan on 12/18/23.  - GDMT: Continue carvedilol 3.125 mg b.i.d., increase losartan to 100 mg daily  - Start imdur 30 mg daily as second anti-anginal  - Continue ASA 81 mg daily, start rosuvastatin 10 mg daily (did not tolerate atorvastatin in the past due to myalgias)    Hypertension   Blood pressure control improved compared to before but still above goal   140/80 today     Plan:  - Continue Coreg 3.125 b.i.d. as above   - Increase losartan to 100 mg daily    History of TIA  - 30 day outpatient ambulatory cardiac monitor (MCOT) to assess for afib (to be mailed in this week)  - Continue ASA 81 mg daily       Follow up in 1 month for GDMT uptitration

## 2023-12-12 ENCOUNTER — TELEPHONE (OUTPATIENT)
Dept: CARDIOLOGY | Facility: CLINIC | Age: 63
End: 2023-12-12
Payer: OTHER GOVERNMENT

## 2023-12-12 NOTE — TELEPHONE ENCOUNTER
"Pt had called stated that since starting 2 new meds he had been having hypotension.  He stated that about an hour after taking meds he felt "like you had been drinking".. Pt notified of the following recommendations from Dr. Pryor : he was not started on two new medications. Losartan was increased and imdur was added. I recommend decreasing losartan back to 50 mg daily. If still has issues despite that, then he can stop imdur. BP dipping down to 105/60.  He voiced understanding of the above. Dr. Pryor notified that patient was having a feeling 'like drinking alcohol"  she stated to stop the Imdur and pt notified.   "

## 2023-12-18 ENCOUNTER — HOSPITAL ENCOUNTER (OUTPATIENT)
Facility: HOSPITAL | Age: 63
Discharge: HOME | End: 2023-12-18
Attending: INTERNAL MEDICINE | Admitting: STUDENT IN AN ORGANIZED HEALTH CARE EDUCATION/TRAINING PROGRAM
Payer: OTHER GOVERNMENT

## 2023-12-18 VITALS
RESPIRATION RATE: 18 BRPM | HEART RATE: 68 BPM | SYSTOLIC BLOOD PRESSURE: 130 MMHG | BODY MASS INDEX: 29.8 KG/M2 | TEMPERATURE: 98 F | WEIGHT: 224.88 LBS | HEIGHT: 73 IN | DIASTOLIC BLOOD PRESSURE: 73 MMHG | OXYGEN SATURATION: 96 %

## 2023-12-18 DIAGNOSIS — R94.39 ABNORMAL STRESS TEST: Primary | ICD-10-CM

## 2023-12-18 LAB
ANION GAP SERPL CALCULATED.3IONS-SCNC: 6 MMOL/L (ref 7–16)
BASOPHILS # BLD AUTO: 0.04 K/UL (ref 0–0.2)
BASOPHILS NFR BLD AUTO: 0.7 % (ref 0–1)
BUN SERPL-MCNC: 16 MG/DL (ref 7–18)
BUN/CREAT SERPL: 21 (ref 6–20)
CALCIUM SERPL-MCNC: 8.8 MG/DL (ref 8.5–10.1)
CHLORIDE SERPL-SCNC: 111 MMOL/L (ref 98–107)
CO2 SERPL-SCNC: 29 MMOL/L (ref 21–32)
CREAT SERPL-MCNC: 0.77 MG/DL (ref 0.7–1.3)
DIFFERENTIAL METHOD BLD: ABNORMAL
EGFR (NO RACE VARIABLE) (RUSH/TITUS): 101 ML/MIN/1.73M2
EOSINOPHIL # BLD AUTO: 0.28 K/UL (ref 0–0.5)
EOSINOPHIL NFR BLD AUTO: 4.9 % (ref 1–4)
ERYTHROCYTE [DISTWIDTH] IN BLOOD BY AUTOMATED COUNT: 13.4 % (ref 11.5–14.5)
GLUCOSE SERPL-MCNC: 97 MG/DL (ref 74–106)
HCT VFR BLD AUTO: 41.4 % (ref 40–54)
HGB BLD-MCNC: 14.2 G/DL (ref 13.5–18)
IMM GRANULOCYTES # BLD AUTO: 0.02 K/UL (ref 0–0.04)
IMM GRANULOCYTES NFR BLD: 0.3 % (ref 0–0.4)
LYMPHOCYTES # BLD AUTO: 1.86 K/UL (ref 1–4.8)
LYMPHOCYTES NFR BLD AUTO: 32.3 % (ref 27–41)
MCH RBC QN AUTO: 30 PG (ref 27–31)
MCHC RBC AUTO-ENTMCNC: 34.3 G/DL (ref 32–36)
MCV RBC AUTO: 87.3 FL (ref 80–96)
MONOCYTES # BLD AUTO: 0.52 K/UL (ref 0–0.8)
MONOCYTES NFR BLD AUTO: 9 % (ref 2–6)
MPC BLD CALC-MCNC: 9.7 FL (ref 9.4–12.4)
NEUTROPHILS # BLD AUTO: 3.03 K/UL (ref 1.8–7.7)
NEUTROPHILS NFR BLD AUTO: 52.8 % (ref 53–65)
NRBC # BLD AUTO: 0 X10E3/UL
NRBC, AUTO (.00): 0 %
PLATELET # BLD AUTO: 260 K/UL (ref 150–400)
POTASSIUM SERPL-SCNC: 3.6 MMOL/L (ref 3.5–5.1)
RBC # BLD AUTO: 4.74 M/UL (ref 4.6–6.2)
SODIUM SERPL-SCNC: 142 MMOL/L (ref 136–145)
WBC # BLD AUTO: 5.75 K/UL (ref 4.5–11)

## 2023-12-18 PROCEDURE — 80048 BASIC METABOLIC PNL TOTAL CA: CPT | Performed by: INTERNAL MEDICINE

## 2023-12-18 PROCEDURE — 93458 L HRT ARTERY/VENTRICLE ANGIO: CPT | Mod: 26,,, | Performed by: HOSPITALIST

## 2023-12-18 PROCEDURE — C1894 INTRO/SHEATH, NON-LASER: HCPCS | Performed by: HOSPITALIST

## 2023-12-18 PROCEDURE — 63600175 PHARM REV CODE 636 W HCPCS: Performed by: HOSPITALIST

## 2023-12-18 PROCEDURE — 99153 MOD SED SAME PHYS/QHP EA: CPT | Performed by: HOSPITALIST

## 2023-12-18 PROCEDURE — 99152 MOD SED SAME PHYS/QHP 5/>YRS: CPT | Performed by: HOSPITALIST

## 2023-12-18 PROCEDURE — 25000003 PHARM REV CODE 250: Performed by: HOSPITALIST

## 2023-12-18 PROCEDURE — 93458 L HRT ARTERY/VENTRICLE ANGIO: CPT

## 2023-12-18 PROCEDURE — 27201423 OPTIME MED/SURG SUP & DEVICES STERILE SUPPLY: Performed by: HOSPITALIST

## 2023-12-18 PROCEDURE — C1769 GUIDE WIRE: HCPCS | Performed by: HOSPITALIST

## 2023-12-18 PROCEDURE — 99152 PR MOD CONSCIOUS SEDATION, SAME PHYS, 5+ YRS, FIRST 15 MIN: ICD-10-PCS | Mod: ,,, | Performed by: HOSPITALIST

## 2023-12-18 PROCEDURE — 93005 ELECTROCARDIOGRAM TRACING: CPT

## 2023-12-18 PROCEDURE — 85025 COMPLETE CBC W/AUTO DIFF WBC: CPT | Performed by: INTERNAL MEDICINE

## 2023-12-18 PROCEDURE — 99152 MOD SED SAME PHYS/QHP 5/>YRS: CPT | Mod: ,,, | Performed by: HOSPITALIST

## 2023-12-18 PROCEDURE — 93458 PR CATH PLACE/CORON ANGIO, IMG SUPER/INTERP,W LEFT HEART VENTRICULOGRAPHY: ICD-10-PCS | Mod: 26,,, | Performed by: HOSPITALIST

## 2023-12-18 PROCEDURE — 25500020 PHARM REV CODE 255: Performed by: HOSPITALIST

## 2023-12-18 RX ORDER — VERAPAMIL HYDROCHLORIDE 2.5 MG/ML
INJECTION, SOLUTION INTRAVENOUS
Status: DISCONTINUED | OUTPATIENT
Start: 2023-12-18 | End: 2023-12-18 | Stop reason: HOSPADM

## 2023-12-18 RX ORDER — MIDAZOLAM HYDROCHLORIDE 1 MG/ML
INJECTION INTRAMUSCULAR; INTRAVENOUS
Status: DISCONTINUED | OUTPATIENT
Start: 2023-12-18 | End: 2023-12-18 | Stop reason: HOSPADM

## 2023-12-18 RX ORDER — SODIUM CHLORIDE 9 MG/ML
INJECTION, SOLUTION INTRAVENOUS
Status: DISCONTINUED | OUTPATIENT
Start: 2023-12-18 | End: 2023-12-18 | Stop reason: HOSPADM

## 2023-12-18 RX ORDER — LIDOCAINE HYDROCHLORIDE 10 MG/ML
INJECTION INFILTRATION; PERINEURAL
Status: DISCONTINUED | OUTPATIENT
Start: 2023-12-18 | End: 2023-12-18 | Stop reason: HOSPADM

## 2023-12-18 RX ORDER — FENTANYL CITRATE 50 UG/ML
INJECTION, SOLUTION INTRAMUSCULAR; INTRAVENOUS
Status: DISCONTINUED | OUTPATIENT
Start: 2023-12-18 | End: 2023-12-18 | Stop reason: HOSPADM

## 2023-12-18 RX ORDER — NITROGLYCERIN 5 MG/ML
INJECTION, SOLUTION INTRAVENOUS
Status: DISCONTINUED | OUTPATIENT
Start: 2023-12-18 | End: 2023-12-18 | Stop reason: HOSPADM

## 2023-12-18 RX ORDER — SODIUM CHLORIDE 0.9 % (FLUSH) 0.9 %
3 SYRINGE (ML) INJECTION EVERY 6 HOURS PRN
Status: DISCONTINUED | OUTPATIENT
Start: 2023-12-18 | End: 2023-12-18 | Stop reason: HOSPADM

## 2023-12-18 RX ORDER — HEPARIN SODIUM 5000 [USP'U]/ML
INJECTION, SOLUTION INTRAVENOUS; SUBCUTANEOUS
Status: DISCONTINUED | OUTPATIENT
Start: 2023-12-18 | End: 2023-12-18 | Stop reason: HOSPADM

## 2023-12-18 RX ORDER — SODIUM CHLORIDE 9 MG/ML
INJECTION, SOLUTION INTRAVENOUS CONTINUOUS
Status: DISCONTINUED | OUTPATIENT
Start: 2023-12-18 | End: 2023-12-18 | Stop reason: HOSPADM

## 2023-12-18 NOTE — Clinical Note
The right radial was prepped. The site was prepped with ChloraPrep. The site was clipped. The patient was draped.

## 2023-12-18 NOTE — Clinical Note
Physician speaking to pt about findings and restrictions r/t procedure. Questions asked and answered. Verbalized understanding.

## 2023-12-18 NOTE — PROGRESS NOTES
Remainder of air removed from TR Band. No active bleeding noted. Dressing applied. Denies pain or needs at present

## 2023-12-18 NOTE — Clinical Note
Attempted to call wife on cell phone, number verified per wire--[phone]. No answer. RUBEN Hesterp out to notify in person of case start and pt status.

## 2023-12-18 NOTE — Clinical Note
The catheter was inserted over the wire into the ostium   left main. An angiography was performed of the left coronary arteries. Multiple views were taken. 10-May-2023

## 2023-12-19 ENCOUNTER — OFFICE VISIT (OUTPATIENT)
Dept: CARDIOLOGY | Facility: CLINIC | Age: 63
End: 2023-12-19
Payer: OTHER GOVERNMENT

## 2023-12-19 VITALS
WEIGHT: 232.38 LBS | HEIGHT: 73 IN | SYSTOLIC BLOOD PRESSURE: 140 MMHG | HEART RATE: 76 BPM | DIASTOLIC BLOOD PRESSURE: 80 MMHG | BODY MASS INDEX: 30.8 KG/M2 | OXYGEN SATURATION: 95 %

## 2023-12-19 DIAGNOSIS — I44.7 LBBB (LEFT BUNDLE BRANCH BLOCK): Primary | ICD-10-CM

## 2023-12-19 DIAGNOSIS — I10 ESSENTIAL HYPERTENSION: ICD-10-CM

## 2023-12-19 DIAGNOSIS — Z86.73 HX OF TRANSIENT ISCHEMIC ATTACK (TIA): ICD-10-CM

## 2023-12-19 DIAGNOSIS — I51.89 MILD LEFT VENTRICULAR SYSTOLIC DYSFUNCTION (LVSD): ICD-10-CM

## 2023-12-19 PROCEDURE — 99214 OFFICE O/P EST MOD 30 MIN: CPT | Mod: PBBFAC | Performed by: STUDENT IN AN ORGANIZED HEALTH CARE EDUCATION/TRAINING PROGRAM

## 2023-12-19 PROCEDURE — 99214 OFFICE O/P EST MOD 30 MIN: CPT | Mod: S$PBB,,, | Performed by: STUDENT IN AN ORGANIZED HEALTH CARE EDUCATION/TRAINING PROGRAM

## 2023-12-19 NOTE — PROGRESS NOTES
PCP: Debi Aleman FNP    Referring Provider:     Subjective:   Julio Hummel is a 63 y.o. male with hx of LBBB, hypertension and possible TIAs who presents for a follow-up visit.     12/19/23:  Presents for follow-up the coronary angiography. Doing well.  Denies chest pain or shortness of breath.  Note: patient did not tolerate Imdur due to lightheadedness/dizziness and hypotension.     12/6/23:  Presents for follow up to discuss abnormal stress test results.  Large size medium intensity fixed mid to basal anteroseptal and mid to basal inferoseptal defects.  Denies chest pain or shortness of breath.  Continues to have vague episodes where he feels off. Usually when sitting. It is difficult to describe but there no dizziness or lightheadedness or focal neurologic changes during these episodes.     11/13/23: Patient is referred to cardiology due to hypertension and hx of TIAs. Per neurology note: Left facial tingling on 9/28/23, he reported symptoms of tingling in the left face initially, but then also in the LUE and LLE.  The symptoms lasted fully about 8 hours, was seen in the ED with negative CT head.  However, he stated symptoms of still having tingling in the left face and left forearm on exam here in clinic, but denied weakness.  On exam diminished vibration and fine touch to the left elbow and forearm.  No other focal deficits noted.  Prior TIA was in 2012, near syncope events that stopped when put on ASA daily, he took this for 10 years with no further events until this most recent referral. Echo obatined 10/9/23 showed EF 40-50%, carotid doppler negative, MRI brain showed mild microvascular changes but no CVA.  MRA brain negative as well.  EKG shows LBBB, chronicity not known.  Denies chest pain/tightness, dyspnea, orthopnea, PND, leg edema, palpitations, lightheadedness or syncope.      Fhx:  No known family history of cardiac disease   Shx:  Never smoker    EKG 11/13/2023: Normal sinus rhythm, left  bundle-branch block    10/9/23: NSR, LBBB   9/23/23: NSR, LBBB     ECHO Results for orders placed during the hospital encounter of 10/09/23    Echo    Interpretation Summary    Left Ventricle: The left ventricle is normal in size. Normal wall thickness. Global hypokinesis and regional wall motion abnormalities present. [place] and anteroseptal wall appears akinetic There is normal diastolic function.    Right Ventricle: Normal right ventricular cavity size. Systolic function is normal.    Aortic Valve: The aortic valve is a trileaflet valve.    Mitral Valve: There is mild regurgitation.    Pulmonary Artery: The estimated pulmonary artery systolic pressure is 19 mmHg.    IVC/SVC: Normal venous pressure at 3 mmHg.     CATH:   Results for orders placed during the hospital encounter of 12/18/23    Cardiac catheterization    Conclusion    There was non-obstructive coronary artery disease..    The pre-procedure left ventricular end diastolic pressure was 9.    The estimated blood loss was <50 mL.    The procedure log was documented by Documenter: Yaritza Garzon RN and verified by Kodi Meehan MD.    Date: 12/18/2023  Time: 9:56 AM      NM MPI 12/1/23:  Impression:     1. Large size, medium intensity fixed defect in the xhm-ii-hpulo inferoseptal and epx-be-txkcr anteroseptal walls with decreased wall motion and thickening, suggestive of infarct.  2. the global left ventricular systolic function is reduced with an LV ejection fraction of 48 % and evidence of LV dilatation. Wall motion is impaired.        Lab Results   Component Value Date     12/18/2023    K 3.6 12/18/2023     (H) 12/18/2023    CO2 29 12/18/2023    BUN 16 12/18/2023    CREATININE 0.77 12/18/2023    CALCIUM 8.8 12/18/2023    ANIONGAP 6 (L) 12/18/2023       Lab Results   Component Value Date    CHOL 139 10/03/2023    CHOL 191 08/18/2022     Lab Results   Component Value Date    HDL 57 10/03/2023    HDL 60 08/18/2022     Lab Results  "  Component Value Date    LDLCALC 70 10/03/2023    LDLCALC 101 08/18/2022     Lab Results   Component Value Date    TRIG 62 10/03/2023    TRIG 149 08/18/2022     Lab Results   Component Value Date    CHOLHDL 2.4 10/03/2023    CHOLHDL 3.2 08/18/2022       Lab Results   Component Value Date    WBC 5.75 12/18/2023    HGB 14.2 12/18/2023    HCT 41.4 12/18/2023    MCV 87.3 12/18/2023     12/18/2023           Current Outpatient Medications:     aspirin (ECOTRIN) 81 MG EC tablet, Take 81 mg by mouth once daily., Disp: , Rfl:     carvediloL (COREG) 3.125 MG tablet, Take 1 tablet (3.125 mg total) by mouth 2 (two) times daily with meals., Disp: 60 tablet, Rfl: 11    fluticasone propionate (FLONASE) 50 mcg/actuation nasal spray, 1 spray by Each Nostril route once daily., Disp: , Rfl:     losartan (COZAAR) 100 MG tablet, Take 1 tablet (100 mg total) by mouth once daily., Disp: 90 tablet, Rfl: 3    Review of Systems   Constitutional:  Negative for chills, diaphoresis, fever and malaise/fatigue.   Respiratory:  Negative for cough and shortness of breath.    Cardiovascular:  Negative for chest pain, palpitations, orthopnea, claudication, leg swelling and PND.   Gastrointestinal:  Negative for abdominal pain, heartburn, nausea and vomiting.   Neurological:  Negative for dizziness.       Objective:   BP (!) 140/80 (BP Location: Left arm, Patient Position: Sitting)   Pulse 76   Ht 6' 1" (1.854 m)   Wt 105.4 kg (232 lb 6.4 oz)   SpO2 95%   BMI 30.66 kg/m²     Physical Exam  Constitutional:       General: He is not in acute distress.     Appearance: Normal appearance.   Cardiovascular:      Rate and Rhythm: Normal rate and regular rhythm.      Pulses: Normal pulses.      Heart sounds: Normal heart sounds. No murmur heard.     No friction rub. No gallop.   Pulmonary:      Effort: Pulmonary effort is normal.      Breath sounds: Normal breath sounds. No wheezing or rales.   Musculoskeletal:      Right lower leg: No edema.      " Left lower leg: No edema.   Skin:     General: Skin is warm and dry.   Neurological:      Mental Status: He is alert.           Assessment:     1. LBBB (left bundle branch block)        2. Hx of transient ischemic attack (TIA)        3. Essential hypertension        4. Mild left ventricular systolic dysfunction (LVSD)                  Plan:   No problem-specific Assessment & Plan notes found for this encounter.    Left bundle-branch block   Mild LV systolic dysfunction  LVEF on recent TTE approximately 40% with regional wall motion abnormalities  NYHA class I, euvolemic on exam  Denies chest pain, dyspnea  Abnormal NM MPI likely secondary to left bundle-branch  Coronary angiography with mild nonobstructive CAD. Cardiomyopathy Is likely 2/2 LBBB    Plan:  - continue Coreg 3.125 mg b.i.d. and losartan 100 mg daily  - Continue aspirin 81 mg daily     Hypertension   - Coreg and losartan as above     ?History of TIA  - 30 day outpatient ambulatory cardiac monitor (MCOT) to assess for afib -> results have not been received, will log in to portal and review.   - Continue ASA 81 mg daily       Follow up in 3 months

## 2024-01-01 PROBLEM — Z09 HOSPITAL DISCHARGE FOLLOW-UP: Status: RESOLVED | Noted: 2023-10-02 | Resolved: 2024-01-01

## 2024-01-29 PROBLEM — G45.9 TIA (TRANSIENT ISCHEMIC ATTACK): Status: RESOLVED | Noted: 2023-10-02 | Resolved: 2024-01-29

## 2024-02-26 LAB
OHS QRS DURATION: 162 MS
OHS QTC CALCULATION: 450 MS

## 2024-08-22 ENCOUNTER — OFFICE VISIT (OUTPATIENT)
Dept: CARDIOLOGY | Facility: CLINIC | Age: 64
End: 2024-08-22
Payer: OTHER GOVERNMENT

## 2024-08-22 VITALS
DIASTOLIC BLOOD PRESSURE: 80 MMHG | HEIGHT: 73 IN | WEIGHT: 213.63 LBS | SYSTOLIC BLOOD PRESSURE: 140 MMHG | HEART RATE: 64 BPM | OXYGEN SATURATION: 96 % | BODY MASS INDEX: 28.31 KG/M2

## 2024-08-22 DIAGNOSIS — I10 HYPERTENSION, UNSPECIFIED TYPE: ICD-10-CM

## 2024-08-22 DIAGNOSIS — I50.22 CHRONIC SYSTOLIC HEART FAILURE: Primary | ICD-10-CM

## 2024-08-22 PROCEDURE — 99214 OFFICE O/P EST MOD 30 MIN: CPT | Mod: PBBFAC,25 | Performed by: STUDENT IN AN ORGANIZED HEALTH CARE EDUCATION/TRAINING PROGRAM

## 2024-08-22 PROCEDURE — 99214 OFFICE O/P EST MOD 30 MIN: CPT | Mod: S$PBB,,, | Performed by: STUDENT IN AN ORGANIZED HEALTH CARE EDUCATION/TRAINING PROGRAM

## 2024-08-22 PROCEDURE — 99999 PR PBB SHADOW E&M-EST. PATIENT-LVL IV: CPT | Mod: PBBFAC,,, | Performed by: STUDENT IN AN ORGANIZED HEALTH CARE EDUCATION/TRAINING PROGRAM

## 2024-08-22 PROCEDURE — 93005 ELECTROCARDIOGRAM TRACING: CPT | Mod: PBBFAC | Performed by: STUDENT IN AN ORGANIZED HEALTH CARE EDUCATION/TRAINING PROGRAM

## 2024-08-22 PROCEDURE — 93010 ELECTROCARDIOGRAM REPORT: CPT | Mod: S$PBB,,, | Performed by: STUDENT IN AN ORGANIZED HEALTH CARE EDUCATION/TRAINING PROGRAM

## 2024-08-22 NOTE — PROGRESS NOTES
PCP: No, Primary Doctor    Referring Provider:     Subjective:   Julio Hummel is a 64 y.o. male with hx of LBBB, hypertension and possible TIAs who presents for a follow-up visit.     8/22/24: Doing well. No cardiac complaints today.    12/19/23:  Presents for follow-up the coronary angiography. Doing well.  Denies chest pain or shortness of breath.  Note: patient did not tolerate Imdur due to lightheadedness/dizziness and hypotension.     12/6/23:  Presents for follow up to discuss abnormal stress test results.  Large size medium intensity fixed mid to basal anteroseptal and mid to basal inferoseptal defects.  Denies chest pain or shortness of breath.  Continues to have vague episodes where he feels off. Usually when sitting. It is difficult to describe but there no dizziness or lightheadedness or focal neurologic changes during these episodes.     11/13/23: Patient is referred to cardiology due to hypertension and hx of TIAs. Per neurology note: Left facial tingling on 9/28/23, he reported symptoms of tingling in the left face initially, but then also in the LUE and LLE.  The symptoms lasted fully about 8 hours, was seen in the ED with negative CT head.  However, he stated symptoms of still having tingling in the left face and left forearm on exam here in clinic, but denied weakness.  On exam diminished vibration and fine touch to the left elbow and forearm.  No other focal deficits noted.  Prior TIA was in 2012, near syncope events that stopped when put on ASA daily, he took this for 10 years with no further events until this most recent referral. Echo obatined 10/9/23 showed EF 40-50%, carotid doppler negative, MRI brain showed mild microvascular changes but no CVA.  MRA brain negative as well.  EKG shows LBBB, chronicity not known.  Denies chest pain/tightness, dyspnea, orthopnea, PND, leg edema, palpitations, lightheadedness or syncope.      Fhx:  No known family history of cardiac disease   Shx:  Never  smoker    EKG 11/13/23: Normal sinus rhythm, left bundle-branch block    10/9/23: NSR, LBBB   9/23/23: NSR, LBBB     ECHO Results for orders placed during the hospital encounter of 10/09/23    Echo    Interpretation Summary    Left Ventricle: The left ventricle is normal in size. Normal wall thickness. Global hypokinesis and regional wall motion abnormalities present. [place] and anteroseptal wall appears akinetic There is normal diastolic function.    Right Ventricle: Normal right ventricular cavity size. Systolic function is normal.    Aortic Valve: The aortic valve is a trileaflet valve.    Mitral Valve: There is mild regurgitation.    Pulmonary Artery: The estimated pulmonary artery systolic pressure is 19 mmHg.    IVC/SVC: Normal venous pressure at 3 mmHg.     CATH:   Results for orders placed during the hospital encounter of 12/18/23    Cardiac catheterization    Conclusion    There was non-obstructive coronary artery disease..    The pre-procedure left ventricular end diastolic pressure was 9.    The estimated blood loss was <50 mL.    The procedure log was documented by Documenter: Yaritza Garzon RN and verified by Kodi Meehan MD.    Date: 12/18/2023  Time: 9:56 AM      NM MPI 12/1/23:  Impression:     1. Large size, medium intensity fixed defect in the ftl-br-lzjbi inferoseptal and fvw-mf-ipjau anteroseptal walls with decreased wall motion and thickening, suggestive of infarct.  2. the global left ventricular systolic function is reduced with an LV ejection fraction of 48 % and evidence of LV dilatation. Wall motion is impaired.        Lab Results   Component Value Date     12/18/2023    K 3.6 12/18/2023     (H) 12/18/2023    CO2 29 12/18/2023    BUN 16 12/18/2023    CREATININE 0.77 12/18/2023    CALCIUM 8.8 12/18/2023    ANIONGAP 6 (L) 12/18/2023       Lab Results   Component Value Date    CHOL 139 10/03/2023    CHOL 191 08/18/2022     Lab Results   Component Value Date    HDL 57  "10/03/2023    HDL 60 08/18/2022     Lab Results   Component Value Date    LDLCALC 70 10/03/2023    LDLCALC 101 08/18/2022     Lab Results   Component Value Date    TRIG 62 10/03/2023    TRIG 149 08/18/2022     Lab Results   Component Value Date    CHOLHDL 2.4 10/03/2023    CHOLHDL 3.2 08/18/2022       Lab Results   Component Value Date    WBC 5.75 12/18/2023    HGB 14.2 12/18/2023    HCT 41.4 12/18/2023    MCV 87.3 12/18/2023     12/18/2023           Current Outpatient Medications:     aspirin (ECOTRIN) 81 MG EC tablet, Take 81 mg by mouth once daily., Disp: , Rfl:     carvediloL (COREG) 3.125 MG tablet, Take 1 tablet (3.125 mg total) by mouth 2 (two) times daily with meals., Disp: 60 tablet, Rfl: 11    fluticasone propionate (FLONASE) 50 mcg/actuation nasal spray, 1 spray by Each Nostril route once daily., Disp: , Rfl:     losartan (COZAAR) 100 MG tablet, Take 1 tablet (100 mg total) by mouth once daily., Disp: 90 tablet, Rfl: 3    Review of Systems   Constitutional:  Negative for chills, diaphoresis, fever and malaise/fatigue.   Respiratory:  Negative for cough and shortness of breath.    Cardiovascular:  Negative for chest pain, palpitations, orthopnea, claudication, leg swelling and PND.   Gastrointestinal:  Negative for abdominal pain, heartburn, nausea and vomiting.   Neurological:  Negative for dizziness.       Objective:   BP (!) 140/80 (BP Location: Left arm, Patient Position: Sitting)   Pulse 64   Ht 6' 1" (1.854 m)   Wt 96.9 kg (213 lb 9.6 oz)   SpO2 96%   BMI 28.18 kg/m²     Physical Exam  Constitutional:       General: He is not in acute distress.     Appearance: Normal appearance.   Cardiovascular:      Rate and Rhythm: Normal rate and regular rhythm.      Pulses: Normal pulses.      Heart sounds: Normal heart sounds. No murmur heard.     No friction rub. No gallop.   Pulmonary:      Effort: Pulmonary effort is normal.      Breath sounds: Normal breath sounds. No wheezing or rales. "   Musculoskeletal:      Right lower leg: No edema.      Left lower leg: No edema.   Skin:     General: Skin is warm and dry.   Neurological:      Mental Status: He is alert.         Assessment:     1. Hypertension, unspecified type  EKG 12-lead    EKG 12-lead                Plan:   No problem-specific Assessment & Plan notes found for this encounter.    Left bundle-branch block   Mild LV systolic dysfunction  LVEF on recent TTE approximately 40% with regional wall motion abnormalities  NYHA class I, euvolemic on exam  Denies chest pain, dyspnea  Abnormal NM MPI likely secondary to left bundle-branch  Coronary angiography with mild nonobstructive CAD. Cardiomyopathy Is likely 2/2 LBBB    Plan:  - continue Coreg 3.125 mg b.i.d. (unable to up titrate due to bradycardia) and losartan 100 mg daily  - Continue aspirin 81 mg daily   - Schedule repeat echo to assess LVEF on optimal GDMT    Hypertension   - Coreg and losartan as above     ?History of TIA  - 30 day outpatient ambulatory cardiac monitor (MCOT) to assess for afib -> results have not been received, log in to portal and review.   - Continue ASA 81 mg daily       Follow up in 6  months

## 2024-08-28 LAB
OHS QRS DURATION: 168 MS
OHS QTC CALCULATION: 583 MS

## 2024-09-09 ENCOUNTER — HOSPITAL ENCOUNTER (OUTPATIENT)
Dept: CARDIOLOGY | Facility: HOSPITAL | Age: 64
Discharge: HOME | End: 2024-09-09
Attending: STUDENT IN AN ORGANIZED HEALTH CARE EDUCATION/TRAINING PROGRAM
Payer: OTHER GOVERNMENT

## 2024-09-09 DIAGNOSIS — I50.22 CHRONIC SYSTOLIC HEART FAILURE: ICD-10-CM

## 2024-09-09 LAB
AORTIC ROOT ANNULUS: 2.72 CM
AV INDEX (PROSTH): 0.68
AV MEAN GRADIENT: 6 MMHG
AV PEAK GRADIENT: 10 MMHG
AV VALVE AREA BY VELOCITY RATIO: 2.05 CM²
AV VALVE AREA: 2.3 CM²
AV VELOCITY RATIO: 0.61
CV ECHO LV RWT: 0.51 CM
DOP CALC AO PEAK VEL: 1.59 M/S
DOP CALC AO VTI: 35.1 CM
DOP CALC LVOT AREA: 3.4 CM2
DOP CALC LVOT DIAMETER: 2.07 CM
DOP CALC LVOT PEAK VEL: 0.97 M/S
DOP CALC LVOT STROKE VOLUME: 80.73 CM3
DOP CALCLVOT PEAK VEL VTI: 24 CM
E WAVE DECELERATION TIME: 129.58 MSEC
E/A RATIO: 0.89
ECHO LV POSTERIOR WALL: 1.25 CM (ref 0.6–1.1)
FRACTIONAL SHORTENING: 44 % (ref 28–44)
INTERVENTRICULAR SEPTUM: 1.23 CM (ref 0.6–1.1)
LEFT ATRIUM AREA SYSTOLIC (APICAL 2 CHAMBER): 18.67 CM2
LEFT ATRIUM AREA SYSTOLIC (APICAL 4 CHAMBER): 20.65 CM2
LEFT ATRIUM VOLUME MOD: 54.75 CM3
LEFT INTERNAL DIMENSION IN SYSTOLE: 2.78 CM (ref 2.1–4)
LEFT VENTRICLE DIASTOLIC VOLUME: 114.79 ML
LEFT VENTRICLE END SYSTOLIC VOLUME APICAL 2 CHAMBER: 51.54 ML
LEFT VENTRICLE END SYSTOLIC VOLUME APICAL 4 CHAMBER: 58.08 ML
LEFT VENTRICLE SYSTOLIC VOLUME: 28.92 ML
LEFT VENTRICULAR INTERNAL DIMENSION IN DIASTOLE: 4.94 CM (ref 3.5–6)
LEFT VENTRICULAR MASS: 240.19 G
LV SEPTAL E/E' RATIO: 7 M/S
LVED V (TEICH): 114.79 ML
LVES V (TEICH): 28.92 ML
LVOT MG: 2.1 MMHG
LVOT MV: 0.68 CM/S
MV PEAK A VEL: 0.79 M/S
MV PEAK E VEL: 0.7 M/S
OHS LV EJECTION FRACTION SIMPSONS BIPLANE MOD: 51 %
PISA TR MAX VEL: 2.69 M/S
PV PEAK GRADIENT: 3 MMHG
PV PEAK VELOCITY: 0.93 M/S
RA VOL SYS: 42.87 ML
RIGHT ATRIAL AREA: 15.7 CM2
RIGHT ATRIUM VOLUME AREA LENGTH APICAL 4 CHAMBER: 40.36 ML
RIGHT VENTRICLE DIASTOLIC BASEL DIMENSION: 3 CM
RIGHT VENTRICLE DIASTOLIC LENGTH: 6.1 CM
RIGHT VENTRICLE DIASTOLIC MID DIMENSION: 2 CM
RIGHT VENTRICULAR LENGTH IN DIASTOLE (APICAL 4-CHAMBER VIEW): 6.08 CM
RV MID DIAMA: 2.04 CM
TDI SEPTAL: 0.1 M/S
TR MAX PG: 29 MMHG
TRICUSPID ANNULAR PLANE SYSTOLIC EXCURSION: 2.08 CM

## 2024-09-09 PROCEDURE — 93306 TTE W/DOPPLER COMPLETE: CPT

## 2024-09-09 PROCEDURE — 93306 TTE W/DOPPLER COMPLETE: CPT | Mod: 26,,, | Performed by: STUDENT IN AN ORGANIZED HEALTH CARE EDUCATION/TRAINING PROGRAM

## 2025-01-09 RX ORDER — LOSARTAN POTASSIUM 100 MG/1
100 TABLET ORAL
Qty: 90 TABLET | Refills: 3 | Status: SHIPPED | OUTPATIENT
Start: 2025-01-09

## 2025-03-27 ENCOUNTER — OFFICE VISIT (OUTPATIENT)
Dept: CARDIOLOGY | Facility: CLINIC | Age: 65
End: 2025-03-27
Payer: OTHER GOVERNMENT

## 2025-03-27 VITALS
OXYGEN SATURATION: 96 % | SYSTOLIC BLOOD PRESSURE: 140 MMHG | HEIGHT: 73 IN | DIASTOLIC BLOOD PRESSURE: 72 MMHG | HEART RATE: 58 BPM | WEIGHT: 232 LBS | BODY MASS INDEX: 30.75 KG/M2

## 2025-03-27 DIAGNOSIS — I44.7 LBBB (LEFT BUNDLE BRANCH BLOCK): ICD-10-CM

## 2025-03-27 DIAGNOSIS — I10 PRIMARY HYPERTENSION: Primary | ICD-10-CM

## 2025-03-27 DIAGNOSIS — Z86.73 HX OF TRANSIENT ISCHEMIC ATTACK (TIA): ICD-10-CM

## 2025-03-27 PROCEDURE — 99214 OFFICE O/P EST MOD 30 MIN: CPT | Mod: S$PBB,,, | Performed by: STUDENT IN AN ORGANIZED HEALTH CARE EDUCATION/TRAINING PROGRAM

## 2025-03-27 PROCEDURE — 99214 OFFICE O/P EST MOD 30 MIN: CPT | Mod: PBBFAC | Performed by: STUDENT IN AN ORGANIZED HEALTH CARE EDUCATION/TRAINING PROGRAM

## 2025-03-27 PROCEDURE — 99999 PR PBB SHADOW E&M-EST. PATIENT-LVL IV: CPT | Mod: PBBFAC,,, | Performed by: STUDENT IN AN ORGANIZED HEALTH CARE EDUCATION/TRAINING PROGRAM

## 2025-03-27 NOTE — PROGRESS NOTES
PCP: No, Primary Doctor    Referring Provider:     Subjective:   Julio Hummel is a 64 y.o. male with hx of LBBB, hypertension and possible TIAs who presents for a follow-up visit.     3/27/25: Doing well. Remains asymptomatic from cardiac standpoint.     8/22/24: Doing well. No cardiac complaints today.    12/19/23:  Presents for follow-up the coronary angiography. Doing well.  Denies chest pain or shortness of breath.  Note: patient did not tolerate Imdur due to lightheadedness/dizziness and hypotension.     12/6/23:  Presents for follow up to discuss abnormal stress test results.  Large size medium intensity fixed mid to basal anteroseptal and mid to basal inferoseptal defects.  Denies chest pain or shortness of breath.  Continues to have vague episodes where he feels off. Usually when sitting. It is difficult to describe but there no dizziness or lightheadedness or focal neurologic changes during these episodes.     11/13/23: Patient is referred to cardiology due to hypertension and hx of TIAs. Per neurology note: Left facial tingling on 9/28/23, he reported symptoms of tingling in the left face initially, but then also in the LUE and LLE.  The symptoms lasted fully about 8 hours, was seen in the ED with negative CT head.  However, he stated symptoms of still having tingling in the left face and left forearm on exam here in clinic, but denied weakness.  On exam diminished vibration and fine touch to the left elbow and forearm.  No other focal deficits noted.  Prior TIA was in 2012, near syncope events that stopped when put on ASA daily, he took this for 10 years with no further events until this most recent referral. Echo obatined 10/9/23 showed EF 40-50%, carotid doppler negative, MRI brain showed mild microvascular changes but no CVA.  MRA brain negative as well.  EKG shows LBBB, chronicity not known.  Denies chest pain/tightness, dyspnea, orthopnea, PND, leg edema, palpitations, lightheadedness or syncope.       Fhx:  No known family history of cardiac disease   Shx:  Never smoker    EKG 3/27/25: Sinus bradycardia 59 bpm, LBBB    ECHO   Results for orders placed during the hospital encounter of 09/09/24    Echo    Interpretation Summary    Left Ventricle: The left ventricle is normal in size. Moderately increased wall thickness. There is concentric hypertrophy. Septal motion is consistent with bundle branch block. There is low normal systolic function with a visually estimated ejection fraction of 50 - 55%. Biplane (2D) method of discs ejection fraction is 51%.    Right Ventricle: Normal right ventricular cavity size. Systolic function is normal.    Pulmonary Artery: No pulmonary hypertension.      Results for orders placed during the hospital encounter of 10/09/23    Echo    Interpretation Summary    Left Ventricle: The left ventricle is normal in size. Normal wall thickness. Global hypokinesis and regional wall motion abnormalities present. [place] and anteroseptal wall appears akinetic There is normal diastolic function.    Right Ventricle: Normal right ventricular cavity size. Systolic function is normal.    Aortic Valve: The aortic valve is a trileaflet valve.    Mitral Valve: There is mild regurgitation.    Pulmonary Artery: The estimated pulmonary artery systolic pressure is 19 mmHg.    IVC/SVC: Normal venous pressure at 3 mmHg.     CATH:   Results for orders placed during the hospital encounter of 12/18/23    Cardiac catheterization    Conclusion    There was non-obstructive coronary artery disease..    The pre-procedure left ventricular end diastolic pressure was 9.    The estimated blood loss was <50 mL.    The procedure log was documented by Documenter: Yaritza Garzon RN and verified by Kodi Meehan MD.    Date: 12/18/2023  Time: 9:56 AM      NM MPI 12/1/23:  Impression:     1. Large size, medium intensity fixed defect in the apo-kw-uotud inferoseptal and wdz-ln-mwpyn anteroseptal walls with decreased  wall motion and thickening, suggestive of infarct.  2. the global left ventricular systolic function is reduced with an LV ejection fraction of 48 % and evidence of LV dilatation. Wall motion is impaired.        Lab Results   Component Value Date     08/22/2024    K 4.1 08/22/2024     08/22/2024    CO2 27 08/22/2024    BUN 22 (H) 08/22/2024    CREATININE 0.90 08/22/2024    CALCIUM 9.3 08/22/2024    ANIONGAP 13 08/22/2024       Lab Results   Component Value Date    CHOL 139 10/03/2023    CHOL 191 08/18/2022     Lab Results   Component Value Date    HDL 57 10/03/2023    HDL 60 08/18/2022     Lab Results   Component Value Date    LDLCALC 70 10/03/2023    LDLCALC 101 08/18/2022     Lab Results   Component Value Date    TRIG 62 10/03/2023    TRIG 149 08/18/2022     Lab Results   Component Value Date    CHOLHDL 2.4 10/03/2023    CHOLHDL 3.2 08/18/2022       Lab Results   Component Value Date    WBC 7.72 08/22/2024    HGB 14.3 08/22/2024    HCT 43.9 08/22/2024    MCV 88.9 08/22/2024     08/22/2024           Current Outpatient Medications:     aspirin (ECOTRIN) 81 MG EC tablet, Take 81 mg by mouth once daily., Disp: , Rfl:     carvediloL (COREG) 3.125 MG tablet, Take 1 tablet (3.125 mg total) by mouth 2 (two) times daily with meals., Disp: 60 tablet, Rfl: 11    fluticasone propionate (FLONASE) 50 mcg/actuation nasal spray, 1 spray by Each Nostril route once daily., Disp: , Rfl:     losartan (COZAAR) 100 MG tablet, TAKE 1 TABLET(100 MG) BY MOUTH EVERY DAY, Disp: 90 tablet, Rfl: 3    Review of Systems   Constitutional:  Negative for chills, diaphoresis, fever and malaise/fatigue.   Respiratory:  Negative for cough and shortness of breath.    Cardiovascular:  Negative for chest pain, palpitations, orthopnea, claudication, leg swelling and PND.   Gastrointestinal:  Negative for abdominal pain, heartburn, nausea and vomiting.   Neurological:  Negative for dizziness.       Objective:   BP (!) 140/72 (BP  "Location: Left arm, Patient Position: Sitting)   Pulse (!) 58   Ht 6' 1" (1.854 m)   Wt 105.2 kg (232 lb)   SpO2 96%   BMI 30.61 kg/m²     Physical Exam  Constitutional:       General: He is not in acute distress.     Appearance: Normal appearance.   Cardiovascular:      Rate and Rhythm: Normal rate and regular rhythm.      Pulses: Normal pulses.      Heart sounds: Normal heart sounds. No murmur heard.     No friction rub. No gallop.   Pulmonary:      Effort: Pulmonary effort is normal.      Breath sounds: Normal breath sounds. No wheezing or rales.   Musculoskeletal:      Right lower leg: No edema.      Left lower leg: No edema.   Skin:     General: Skin is warm and dry.   Neurological:      Mental Status: He is alert.           Assessment:     1. Primary hypertension  EKG 12-lead    EKG 12-lead    Comprehensive Metabolic Panel    Lipid Panel    CANCELED: Comprehensive Metabolic Panel      2. LBBB (left bundle branch block)        3. Hx of transient ischemic attack (TIA)                    Plan:   No problem-specific Assessment & Plan notes found for this encounter.    Left bundle-branch block   Hx Mild LV systolic dysfunction -> now with LVEF recovery  LVEF on  TTE 10/2023 40% with regional wall motion abnormalities  Coronary angiography with mild nonobstructive CAD. Cardiomyopathy likely 2/2 LBBB  Repeat TTE 9/2024 with improvement in LVEF to 50-55%    Plan:  - Continue Coreg 3.125 mg b.i.d. (unable to up titrate due to resting bradycardia) and losartan 100 mg daily  - Continue aspirin 81 mg daily   - Check CMP next week     Hypertension   - Coreg and losartan as above     ?History of TIA  - 30 day outpatient ambulatory cardiac monitor (MCOT) to assess for afib -> results have not been received, log in to portal and review.   - Continue ASA 81 mg daily   - Not on statin. Check fasting lipid panel in 1 week       Follow up in 1 year          "

## 2025-04-04 ENCOUNTER — RESULTS FOLLOW-UP (OUTPATIENT)
Dept: CARDIOLOGY | Facility: CLINIC | Age: 65
End: 2025-04-04

## (undated) DEVICE — CATH IV 20G 1.16 IN AUTOGARD

## (undated) DEVICE — DRESSING TRANS 4X4 TEGADERM

## (undated) DEVICE — SET EXT CATH NONDEHP .8 6.5IN

## (undated) DEVICE — COVER PROBE US GEL BAND

## (undated) DEVICE — ETCO2 NC MICROSTR FEM ST ADLT

## (undated) DEVICE — CATH IMPULSE 5F 100CM FR4

## (undated) DEVICE — PROTECTOR ULNAR NERVE FOAM

## (undated) DEVICE — CHLORAPREP 10.5 ML APPLICATOR

## (undated) DEVICE — CUFF FLEXIPORT BP LONG ADULT

## (undated) DEVICE — VASC HEMO BAND

## (undated) DEVICE — INTRODUCER KIT MICRO 4FR

## (undated) DEVICE — DECANTER FLUID TRNSF WHITE 9IN

## (undated) DEVICE — KIT GLIDESHEATH SLEND 6FR 10CM

## (undated) DEVICE — SHEATH INTRODUCER 6FR 11CM

## (undated) DEVICE — GLOVE SENSICARE PI SURG 7

## (undated) DEVICE — CONTRAST ISOVUE 370 100ML

## (undated) DEVICE — KIT IV START WITH PREVANTICS

## (undated) DEVICE — SET EXTENSION CLEARLINK 2INJ

## (undated) DEVICE — Device

## (undated) DEVICE — OXISENSOR ADULT DIGIT N/S

## (undated) DEVICE — CATH FL 3.5 5FR

## (undated) DEVICE — SET IV PRIMARY

## (undated) DEVICE — CATH IMPULSE FL4 5FR 100CM

## (undated) DEVICE — NDL PERCUTANEOUS 2.5CM 21G

## (undated) DEVICE — CLIPPER BLADE MOD 4406 (CAREF)

## (undated) DEVICE — GUIDEWIRE INQWIRE SE 3MM JTIP